# Patient Record
Sex: FEMALE | Race: WHITE | NOT HISPANIC OR LATINO | Employment: OTHER | URBAN - METROPOLITAN AREA
[De-identification: names, ages, dates, MRNs, and addresses within clinical notes are randomized per-mention and may not be internally consistent; named-entity substitution may affect disease eponyms.]

---

## 2022-08-24 ENCOUNTER — OFFICE VISIT (OUTPATIENT)
Dept: SURGICAL ONCOLOGY | Facility: CLINIC | Age: 77
End: 2022-08-24
Payer: MEDICARE

## 2022-08-24 VITALS
BODY MASS INDEX: 38.05 KG/M2 | DIASTOLIC BLOOD PRESSURE: 79 MMHG | SYSTOLIC BLOOD PRESSURE: 161 MMHG | HEART RATE: 81 BPM | HEIGHT: 65 IN | WEIGHT: 228.38 LBS

## 2022-08-24 DIAGNOSIS — K86.2 PANCREATIC CYST: Primary | ICD-10-CM

## 2022-08-24 DIAGNOSIS — Z85.048 PERSONAL HISTORY OF RECTAL CANCER: ICD-10-CM

## 2022-08-24 PROCEDURE — 99213 OFFICE O/P EST LOW 20 MIN: CPT | Mod: PBBFAC | Performed by: SURGERY

## 2022-08-24 PROCEDURE — 99204 PR OFFICE/OUTPT VISIT, NEW, LEVL IV, 45-59 MIN: ICD-10-PCS | Mod: S$PBB,,, | Performed by: SURGERY

## 2022-08-24 PROCEDURE — 99999 PR PBB SHADOW E&M-EST. PATIENT-LVL III: ICD-10-PCS | Mod: PBBFAC,,, | Performed by: SURGERY

## 2022-08-24 PROCEDURE — 99999 PR PBB SHADOW E&M-EST. PATIENT-LVL III: CPT | Mod: PBBFAC,,, | Performed by: SURGERY

## 2022-08-24 PROCEDURE — 99204 OFFICE O/P NEW MOD 45 MIN: CPT | Mod: S$PBB,,, | Performed by: SURGERY

## 2022-08-24 RX ORDER — GLIMEPIRIDE 4 MG/1
4 TABLET ORAL EVERY MORNING
COMMUNITY
Start: 2022-08-02

## 2022-08-24 RX ORDER — SITAGLIPTIN 100 MG/1
100 TABLET, FILM COATED ORAL DAILY
COMMUNITY
Start: 2022-06-01

## 2022-08-24 RX ORDER — SERTRALINE HYDROCHLORIDE 50 MG/1
50 TABLET, FILM COATED ORAL DAILY
COMMUNITY
Start: 2022-08-17

## 2022-08-24 RX ORDER — GABAPENTIN 300 MG/1
300 CAPSULE ORAL 2 TIMES DAILY PRN
COMMUNITY
Start: 2022-08-09 | End: 2022-11-25 | Stop reason: CLARIF

## 2022-08-24 RX ORDER — MONTELUKAST SODIUM 10 MG/1
10 TABLET ORAL DAILY
COMMUNITY
Start: 2022-07-10

## 2022-08-24 RX ORDER — LOSARTAN POTASSIUM 25 MG/1
25 TABLET ORAL DAILY
COMMUNITY
Start: 2022-06-16

## 2022-08-24 RX ORDER — OMEPRAZOLE 20 MG/1
20 CAPSULE, DELAYED RELEASE ORAL DAILY
COMMUNITY
Start: 2022-06-01

## 2022-08-24 RX ORDER — HYDROCHLOROTHIAZIDE 25 MG/1
25 TABLET ORAL DAILY
COMMUNITY
Start: 2022-08-03

## 2022-08-24 NOTE — PROGRESS NOTES
Chief complaint:  Parastomal hernia, pancreatic cyst     HPI:  76-year-old female known to us for previous laparoscopic/robotic abdominoperineal resection in  May of 2020 after neoadjuvant chemoradiation.  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  She went on to receive adjuvant chemotherapy.  She presents today complaining of a parastomal hernia.  She notes bulging in this area with no significant pain.  She reports no significant difficulties with her ostomy appliance or leakage.  Of note during her surveillance imaging they also identified a 2.5 cm enhancing pancreatic mass in the tail of the gland, percutaneous biopsy was performed and revealed normal pancreatic parenchyma.  Follow-up MRI this month revealed stability in size and characteristics.  She reports difficulties with hyperglycemia, no other functional symptoms.        Past Medical and Surgical History  Allergies :   Ephedrine and Penicillin    @[facility]@  Medical :   She has a past medical history of Asthma, Diabetes mellitus, type 2, Hypertension, IBS (irritable bowel syndrome), and Osteoarthritis.    Surgical :   She has a past surgical history that includes Low anterior resection of colon and Wart Removed from Wrist.     Family History  Her family history is not on file.    Social History  She      Review of Systems   Constitutional: Negative for appetite change, chills, diaphoresis and fever.   HENT: Negative for congestion, drooling, ear discharge, ear pain and hearing loss.    Eyes: Negative for discharge.   Respiratory: Negative for apnea, cough, choking, chest tightness, shortness of breath and stridor.    Cardiovascular: Negative for chest pain, palpitations and leg swelling.   Endocrine: Negative for cold intolerance and heat intolerance.   Genitourinary: Negative for difficulty urinating, dyspareunia, dysuria and hematuria.   Musculoskeletal: Negative for arthralgias, gait  problem and joint swelling.   Skin: Negative for color change and rash.   Neurological: Negative for dizziness, tremors, seizures, syncope, facial asymmetry, speech difficulty, light-headedness, numbness and headaches.   Psychiatric/Behavioral: Negative for agitation and confusion.        Objective   Physical Exam  Vitals and nursing note reviewed.   Constitutional:       General: She is not in acute distress.     Appearance: Normal appearance. She is not ill-appearing, toxic-appearing or diaphoretic.   HENT:      Head: Normocephalic and atraumatic.      Right Ear: External ear normal.      Left Ear: External ear normal.      Nose: Nose normal.      Mouth/Throat:      Mouth: Mucous membranes are moist.      Pharynx: Oropharynx is clear.   Eyes:      General: No scleral icterus.     Extraocular Movements: Extraocular movements intact.      Conjunctiva/sclera: Conjunctivae normal.      Pupils: Pupils are equal, round, and reactive to light.   Cardiovascular:      Rate and Rhythm: Normal rate and regular rhythm.      Pulses: Normal pulses.      Heart sounds: No murmur heard.    No friction rub. No gallop.   Pulmonary:      Effort: Pulmonary effort is normal. No respiratory distress.      Breath sounds: Normal breath sounds. No stridor.   Abdominal:      General: Abdomen is flat. There is no distension.      Palpations: Abdomen is soft. There is no mass.      Tenderness: There is no abdominal tenderness. There is no right CVA tenderness, left CVA tenderness, guarding or rebound.      Hernia: No hernia is present.   Musculoskeletal:         General: No swelling, tenderness, deformity or signs of injury.      Cervical back: Normal range of motion and neck supple. No rigidity or tenderness.      Right lower leg: No edema.      Left lower leg: No edema.   Lymphadenopathy:      Cervical: No cervical adenopathy.   Skin:     General: Skin is warm.      Capillary Refill: Capillary refill takes less than 2 seconds.       "Coloration: Skin is not jaundiced or pale.      Findings: No bruising, erythema, lesion or rash.   Neurological:      General: No focal deficit present.      Mental Status: She is alert and oriented to person, place, and time. Mental status is at baseline.      Cranial Nerves: No cranial nerve deficit.      Sensory: No sensory deficit.      Motor: No weakness.      Coordination: Coordination normal.      Gait: Gait normal.   Psychiatric:         Mood and Affect: Mood normal.         Behavior: Behavior normal.         Thought Content: Thought content normal.         Judgment: Judgment normal.       VITAL SIGNS: 24 HR MIN & MAX LAST    @FLOWSTAT(6:24::1)@           @FLOWSTAT(5:24::1)@  (!) 161/79     @FLOWSTAT(8:24::1)@  81     @FLOWSTAT(9:24::1)@       @FLOWSTAT(10:24::1)@         HT: 5' 5" (165.1 cm)  WT: 103.6 kg (228 lb 6.4 oz)  BMI: 38       Assessment & Plan     Parastomal hernia- no current issues with appliance seating or leakage.  Recommend only observation at this time.  Repair would only be entertained if she begins to have issues with her ostomy.    Pancreatic cyst/enhancing mass-- stability in size over time is reassuring, however have some concerns about sampling error with percutaneous biopsy of such a small lesion.  We will refer for EUS/FNA for further characterization. RTC after EUS.     "

## 2022-09-07 ENCOUNTER — DOCUMENTATION ONLY (OUTPATIENT)
Dept: SURGICAL ONCOLOGY | Facility: CLINIC | Age: 77
End: 2022-09-07
Payer: MEDICARE

## 2022-09-07 DIAGNOSIS — K86.2 PANCREATIC CYST: Primary | ICD-10-CM

## 2022-09-07 NOTE — PROGRESS NOTES
Referral faxed to Dr. Mateo Larry for EUS. They are to contact patient with appointment date and time.    P# [phone]  F# [phone]

## 2022-10-19 ENCOUNTER — ANESTHESIA EVENT (OUTPATIENT)
Dept: SURGERY | Facility: HOSPITAL | Age: 77
End: 2022-10-19
Payer: MEDICARE

## 2022-10-19 RX ORDER — OMEPRAZOLE 20 MG/1
20 CAPSULE, DELAYED RELEASE ORAL DAILY
COMMUNITY

## 2022-10-19 RX ORDER — LOSARTAN POTASSIUM 25 MG/1
25 TABLET ORAL DAILY
COMMUNITY

## 2022-10-19 RX ORDER — MULTIVIT WITH MINERALS/HERBS
1 TABLET ORAL DAILY
COMMUNITY

## 2022-10-19 RX ORDER — SERTRALINE HYDROCHLORIDE 50 MG/1
50 TABLET, FILM COATED ORAL DAILY
COMMUNITY

## 2022-10-19 RX ORDER — MONTELUKAST SODIUM 10 MG/1
10 TABLET ORAL NIGHTLY
COMMUNITY

## 2022-10-19 RX ORDER — ASCORBIC ACID 500 MG
500 TABLET ORAL DAILY
COMMUNITY

## 2022-10-19 RX ORDER — GLIMEPIRIDE 4 MG/1
4 TABLET ORAL
COMMUNITY

## 2022-10-19 RX ORDER — HYDROCHLOROTHIAZIDE 25 MG/1
25 TABLET ORAL DAILY
COMMUNITY

## 2022-10-19 RX ORDER — GABAPENTIN 300 MG/1
300 CAPSULE ORAL 2 TIMES DAILY
COMMUNITY

## 2022-10-20 ENCOUNTER — ANESTHESIA (OUTPATIENT)
Dept: SURGERY | Facility: HOSPITAL | Age: 77
End: 2022-10-20
Payer: MEDICARE

## 2022-10-20 ENCOUNTER — HOSPITAL ENCOUNTER (OUTPATIENT)
Facility: HOSPITAL | Age: 77
Discharge: HOME | End: 2022-10-20
Attending: INTERNAL MEDICINE | Admitting: INTERNAL MEDICINE
Payer: MEDICARE

## 2022-10-20 DIAGNOSIS — R93.3 GASTROINTESTINAL TRACT IMAGING ABNORMALITY: ICD-10-CM

## 2022-10-20 DIAGNOSIS — R19.09 OTHER INTRA-ABDOMINAL AND PELVIC SWELLING, MASS AND LUMP: ICD-10-CM

## 2022-10-20 LAB
CANCER AG19-9 SERPL-ACNC: 3.67 UNIT/ML (ref 0–37)
POCT GLUCOSE: 228 MG/DL (ref 70–110)

## 2022-10-20 PROCEDURE — 27201423 OPTIME MED/SURG SUP & DEVICES STERILE SUPPLY: Performed by: INTERNAL MEDICINE

## 2022-10-20 PROCEDURE — 37000009 HC ANESTHESIA EA ADD 15 MINS: Performed by: INTERNAL MEDICINE

## 2022-10-20 PROCEDURE — 86301 IMMUNOASSAY TUMOR CA 19-9: CPT | Performed by: INTERNAL MEDICINE

## 2022-10-20 PROCEDURE — 63600175 PHARM REV CODE 636 W HCPCS

## 2022-10-20 PROCEDURE — 43238 EGD US FINE NEEDLE BX/ASPIR: CPT | Performed by: INTERNAL MEDICINE

## 2022-10-20 PROCEDURE — C1753 CATH, INTRAVAS ULTRASOUND: HCPCS | Performed by: INTERNAL MEDICINE

## 2022-10-20 PROCEDURE — 36415 COLL VENOUS BLD VENIPUNCTURE: CPT | Performed by: INTERNAL MEDICINE

## 2022-10-20 PROCEDURE — 25000003 PHARM REV CODE 250

## 2022-10-20 PROCEDURE — 88305 TISSUE EXAM BY PATHOLOGIST: CPT | Performed by: INTERNAL MEDICINE

## 2022-10-20 PROCEDURE — 37000008 HC ANESTHESIA 1ST 15 MINUTES: Performed by: INTERNAL MEDICINE

## 2022-10-20 RX ORDER — SODIUM CHLORIDE, SODIUM GLUCONATE, SODIUM ACETATE, POTASSIUM CHLORIDE AND MAGNESIUM CHLORIDE 30; 37; 368; 526; 502 MG/100ML; MG/100ML; MG/100ML; MG/100ML; MG/100ML
1000 INJECTION, SOLUTION INTRAVENOUS CONTINUOUS
Status: CANCELLED | OUTPATIENT
Start: 2022-10-20 | End: 2022-11-19

## 2022-10-20 RX ORDER — PROPOFOL 10 MG/ML
VIAL (ML) INTRAVENOUS
Status: DISCONTINUED | OUTPATIENT
Start: 2022-10-20 | End: 2022-10-20

## 2022-10-20 RX ORDER — DIPHENHYDRAMINE HYDROCHLORIDE 50 MG/ML
25 INJECTION INTRAMUSCULAR; INTRAVENOUS EVERY 6 HOURS PRN
Status: CANCELLED | OUTPATIENT
Start: 2022-10-20

## 2022-10-20 RX ORDER — MIDAZOLAM HYDROCHLORIDE 1 MG/ML
INJECTION INTRAMUSCULAR; INTRAVENOUS
Status: DISCONTINUED | OUTPATIENT
Start: 2022-10-20 | End: 2022-10-20

## 2022-10-20 RX ORDER — ONDANSETRON 2 MG/ML
4 INJECTION INTRAMUSCULAR; INTRAVENOUS DAILY PRN
Status: CANCELLED | OUTPATIENT
Start: 2022-10-20

## 2022-10-20 RX ORDER — HYDROMORPHONE HYDROCHLORIDE 2 MG/ML
0.2 INJECTION, SOLUTION INTRAMUSCULAR; INTRAVENOUS; SUBCUTANEOUS EVERY 5 MIN PRN
Status: CANCELLED | OUTPATIENT
Start: 2022-10-20

## 2022-10-20 RX ORDER — ACETAMINOPHEN 10 MG/ML
1000 INJECTION, SOLUTION INTRAVENOUS ONCE
Status: CANCELLED | OUTPATIENT
Start: 2022-10-20 | End: 2022-10-20

## 2022-10-20 RX ORDER — PROPOFOL 10 MG/ML
VIAL (ML) INTRAVENOUS CONTINUOUS PRN
Status: DISCONTINUED | OUTPATIENT
Start: 2022-10-20 | End: 2022-10-20

## 2022-10-20 RX ORDER — MIDAZOLAM HYDROCHLORIDE 1 MG/ML
2 INJECTION INTRAMUSCULAR; INTRAVENOUS ONCE AS NEEDED
Status: CANCELLED | OUTPATIENT
Start: 2022-10-20 | End: 2034-03-18

## 2022-10-20 RX ORDER — LIDOCAINE HYDROCHLORIDE 10 MG/ML
1 INJECTION, SOLUTION EPIDURAL; INFILTRATION; INTRACAUDAL; PERINEURAL ONCE
Status: CANCELLED | OUTPATIENT
Start: 2022-10-20 | End: 2022-10-20

## 2022-10-20 RX ADMIN — PROPOFOL 100 MCG/KG/MIN: 10 INJECTION, EMULSION INTRAVENOUS at 09:10

## 2022-10-20 RX ADMIN — PROPOFOL 50 MCG/KG/MIN: 10 INJECTION, EMULSION INTRAVENOUS at 10:10

## 2022-10-20 RX ADMIN — PROPOFOL 30 MG: 10 INJECTION, EMULSION INTRAVENOUS at 10:10

## 2022-10-20 RX ADMIN — SODIUM CHLORIDE, SODIUM GLUCONATE, SODIUM ACETATE, POTASSIUM CHLORIDE AND MAGNESIUM CHLORIDE: 526; 502; 368; 37; 30 INJECTION, SOLUTION INTRAVENOUS at 10:10

## 2022-10-20 RX ADMIN — MIDAZOLAM HYDROCHLORIDE 2 MG: 1 INJECTION, SOLUTION INTRAMUSCULAR; INTRAVENOUS at 10:10

## 2022-10-20 RX ADMIN — PROPOFOL 20 MG: 10 INJECTION, EMULSION INTRAVENOUS at 09:10

## 2022-10-20 NOTE — TRANSFER OF CARE
"Anesthesia Transfer of Care Note    Patient: Sierra Alva    Procedure(s) Performed: Procedure(s) (LRB):  Upper EUS w/poss FNA. (N/A)    Patient location: OPS    Anesthesia Type: general    Transport from OR: Transported from OR on 6-10 L/min O2 by face mask with adequate spontaneous ventilation    Post pain: adequate analgesia    Post assessment: no apparent anesthetic complications and tolerated procedure well    Post vital signs: stable    Level of consciousness: awake, alert and oriented    Nausea/Vomiting: no nausea/vomiting    Complications: none    Transfer of care protocol was followed      Last vitals:   Visit Vitals  BP (!) 142/88   Pulse 73   Temp 36.4 °C (97.6 °F) (Oral)   Ht 5' 5" (1.651 m)   Wt 103 kg (227 lb)   LMP  (LMP Unknown)   SpO2 96%   Breastfeeding No   BMI 37.77 kg/m²     "

## 2022-10-20 NOTE — PROVATION PATIENT INSTRUCTIONS
Discharge Summary/Instructions after an Endoscopic Procedure  Patient Name: Sierra Alva  Patient MRN: [id number]  Patient YOB: 1945 Thursday, October 20, 2022  Mateo Larry MD  Dear patient,  As a result of recent federal legislation (The Federal Cures Act), you may   receive lab or pathology results from your procedure in your [facility]   account before your physician is able to contact you. Your physician or   their representative will relay the results to you with their   recommendations at their soonest availability.  Thank you,  RESTRICTIONS:  During your procedure today, you received medications for sedation.  These   medications may affect your judgment, balance and coordination.  Therefore,   for 24 hours, you have the following restrictions:   - DO NOT drive a car, operate machinery, make legal/financial decisions,   sign important papers or drink alcohol.    ACTIVITY:  Today: no heavy lifting, straining or running due to procedural   sedation/anesthesia.  The following day: return to full activity including work.  DIET:  Eat and drink normally unless instructed otherwise.     TREATMENT FOR COMMON SIDE EFFECTS:  - Mild abdominal pain, nausea, belching, bloating or excessive gas:  rest,   eat lightly and use a heating pad.  - Sore Throat: treat with throat lozenges and/or gargle with warm salt   water.  - Because air was used during the procedure, expelling large amounts of air   from your rectum or belching is normal.  - If a bowel prep was taken, you may not have a bowel movement for 1-3 days.    This is normal.  SYMPTOMS TO WATCH FOR AND REPORT TO YOUR PHYSICIAN:  1. Abdominal pain or bloating, other than gas cramps.  2. Chest pain.  3. Back pain.  4. Signs of infection such as: chills or fever occurring within 24 hours   after the procedure.  5. Rectal bleeding, which would show as bright red, maroon, or black stools.   (A tablespoon of blood from the rectum is not serious, especially  if   hemorrhoids are present.)  6. Vomiting.  7. Weakness or dizziness.  GO DIRECTLY TO THE NEAREST EMERGENCY ROOM IF YOU HAVE ANY OF THE FOLLOWING:      Difficulty breathing              Chills and/or fever over 101 F   Persistent vomiting and/or vomiting blood   Severe abdominal pain   Severe chest pain   Black, tarry stools   Bleeding- more than one tablespoon   Any other symptom or condition that you feel may need urgent attention  Your doctor recommends these additional instructions:  If any biopsies were taken, your doctors clinic will contact you in 1 to 2   weeks with any results.  - Discharge patient to home (with spouse).   - Clear liquid diet today, then advance as tolerated to resume previous   diet.   - Continue present medications.   - Observe patient's clinical course.   - Await cytology results.   - Return to referring physician as previously scheduled.   - The findings and recommendations were discussed with the patient and their   spouse.  For questions, problems or results please call your physician - Mateo Larry MD at Work:  [phone].  [facility] [place], EMERGENCY ROOM PHONE NUMBER: [phone]  IF A COMPLICATION OR EMERGENCY SITUATION ARISES AND YOU ARE UNABLE TO REACH   YOUR PHYSICIAN - GO DIRECTLY TO THE EMERGENCY ROOM.  Mateo Larry MD  10/20/2022 11:14:57 AM  This report has been verified and signed electronically.  Dear patient,  As a result of recent federal legislation (The Federal Cures Act), you may   receive lab or pathology results from your procedure in your [facility]   account before your physician is able to contact you. Your physician or   their representative will relay the results to you with their   recommendations at their soonest availability.  Thank you,  PROVATION

## 2022-10-20 NOTE — DISCHARGE INSTRUCTIONS
-NO driving and NO alcohol consumption for 24 hours and while taking narcotic pain medication.      -Although no incisions were made monitor for signs of infection such as fever, chills.      -Sore throat and/or mild cough is expected.  Hard candies, peppermints, throat lozenges, gargling with warm water and salt may help     -Report to your nearest ER and/notify your doctor if you experience any SUDDEN/SEVERE chest/abdominal pain, weakness, trouble breathing, uncontrolled pain, vomiting/coughing up blood.

## 2022-10-20 NOTE — ANESTHESIA PREPROCEDURE EVALUATION
10/20/2022  Sierra Alva is a 76 y.o. female with GI tract imaging abnormality for EGD and U/S guided biopsy.  She is feeling well this AM, denies cardiopulmonary complaints.      Pre-op Assessment    I have reviewed the Patient Summary Reports.     I have reviewed the Nursing Notes. I have reviewed the NPO Status.   I have reviewed the Medications.     Review of Systems  Anesthesia Hx:  No problems with previous Anesthesia  Denies Family Hx of Anesthesia complications.   Denies Personal Hx of Anesthesia complications.   Cardiovascular:   Exercise tolerance: good Hypertension    Pulmonary:   Asthma    Hepatic/GI:   GERD    Musculoskeletal:   Arthritis     Endocrine:   Diabetes  Obesity / BMI > 30      Physical Exam  General: Well nourished, Cooperative, Alert and Oriented    Airway:  Mallampati: II   Mouth Opening: Normal  TM Distance: Normal  Tongue: Normal  Neck ROM: Normal ROM    Dental:  Intact    Chest/Lungs:  Clear to auscultation, Normal Respiratory Rate    Heart:  Rate: Normal  Rhythm: Regular Rhythm        Anesthesia Plan  Type of Anesthesia, risks & benefits discussed:    Anesthesia Type: Gen Natural Airway  Intra-op Monitoring Plan: Standard ASA Monitors  Post Op Pain Control Plan: multimodal analgesia  Induction:  IV  Informed Consent: Informed consent signed with the Patient and all parties understand the risks and agree with anesthesia plan.  All questions answered.   ASA Score: 2  Day of Surgery Review of History & Physical: H&P Update referred to the surgeon/provider.    Ready For Surgery From Anesthesia Perspective.     .

## 2022-10-20 NOTE — H&P
Endoscopy History and Physical    PCP - Drew Esquivel MD    Procedure - EUS  ASA & Mallampati - per anesthesia      HPI:  This is a 76 y.o. female here for evaluation of a TOP lesion.       ROS:  Constitutional: No fevers, chills, No weight loss  ENT: No allergies  CV: No chest pain  Pulm: No shortness of breath  GI: see HPI  Derm: No rash    Medical History:  has a past medical history of Abdominal hernia, Abdominal pain, Abnormal findings on radiological examination of gastrointestinal tract, Anxiety, Arthritis, Asthma, Colon cancer, Colostomy in place, Diabetes mellitus, Diabetes mellitus, type 2, GERD (gastroesophageal reflux disease), Hypertension, Hypotension, iatrogenic (1965), IBS (irritable bowel syndrome), Neuropathy, Obesity, Osteoarthritis, Other intra-abdominal and pelvic swelling, mass and lump, Shingles (2019), and Walker as ambulation aid.    Surgical History:  has a past surgical history that includes Low anterior resection of colon; Wart Removed from Wrist; Colectomy; Colonoscopy; Removal Wart Hand; and Mediport insertion, single.    Family History: family history is not on file.     Social History:  reports that she has quit smoking. Her smoking use included cigarettes. She has never used smokeless tobacco. She reports current alcohol use. She reports that she does not use drugs.    Review of patient's allergies indicates:   Allergen Reactions    Amoxicillin Rash    Ephedrine Palpitations     Other reaction(s): Throat tightness    Ephedrine Palpitations    Penicillin Rash       Medications:   Medications Prior to Admission   Medication Sig Dispense Refill Last Dose    ascorbic acid, vitamin C, (VITAMIN C) 500 MG tablet Take 500 mg by mouth once daily.   10/19/2022    b complex vitamins tablet Take 1 tablet by mouth once daily.   10/19/2022    BIOTIN ORAL Take 1 tablet by mouth Daily.   10/19/2022    calcium carbonate/vitamin D3 (VITAMIN D-3 ORAL) Take 1 tablet by mouth Daily.   10/19/2022     gabapentin (NEURONTIN) 300 MG capsule Take 300 mg by mouth 2 (two) times daily as needed.   10/19/2022    gabapentin (NEURONTIN) 300 MG capsule Take 300 mg by mouth 2 (two) times daily.       glimepiride (AMARYL) 4 MG tablet Take 4 mg by mouth every morning.   10/19/2022    glimepiride (AMARYL) 4 MG tablet Take 4 mg by mouth before breakfast.       glucosamine HCl/chondroitin gresham (GLUCOSAMINE-CHONDROITIN ORAL) Take 1 tablet by mouth Daily.   10/19/2022    grape seed extract (GRAPE SEED ORAL) Take 1 capsule by mouth Daily.   10/19/2022    hydroCHLOROthiazide (HYDRODIURIL) 25 MG tablet Take 25 mg by mouth once daily.   10/20/2022    JANUVIA 100 mg Tab Take 100 mg by mouth once daily.   10/19/2022    losartan (COZAAR) 25 MG tablet Take 25 mg by mouth once daily.   10/20/2022    losartan (COZAAR) 25 MG tablet Take 25 mg by mouth once daily.       MAGNESIUM ORAL Take 1 tablet by mouth Daily.   10/19/2022    montelukast (SINGULAIR) 10 mg tablet Take 10 mg by mouth once daily.   10/19/2022    montelukast (SINGULAIR) 10 mg tablet Take 10 mg by mouth every evening.       NON FORMULARY MEDICATION Take 1 tablet by mouth once daily. [facility]   10/19/2022    NON FORMULARY MEDICATION Take 1 tablet by mouth every other day. Liver Detox   10/19/2022    omeprazole (PRILOSEC) 20 MG capsule Take 20 mg by mouth once daily.   10/19/2022    omeprazole (PRILOSEC) 20 MG capsule Take 20 mg by mouth once daily.       POTASSIUM ORAL Take 1 tablet by mouth Daily.   10/19/2022    sertraline (ZOLOFT) 50 MG tablet Take 50 mg by mouth once daily.   10/19/2022    sertraline (ZOLOFT) 50 MG tablet Take 50 mg by mouth once daily.       SITagliptin (JANUVIA) 100 MG Tab Take 100 mg by mouth once daily.       hydroCHLOROthiazide (HYDRODIURIL) 25 MG tablet Take 25 mg by mouth once daily.            Objective Findings:    Vital Signs: see nursing notes  Physical Exam:  General Appearance: Obese WF, well appearing in no acute distress  Eyes:    No scleral  icterus  ENT: Neck supple  Lungs: CTA anteriorly  Heart:  S1, S2 normal, no murmurs heard  Abdomen: Soft, non tender, non distended with positive bowel sounds. No hepatosplenomegaly, ascites, or mass  Extremities: no edema  Skin: No rash      Labs:  Lab Results   Component Value Date    WBC 3.7 (L) 06/02/2020    HGB 12.1 06/02/2020    HCT 37.2 06/02/2020     (L) 06/02/2020    ALT 19 06/02/2020    AST 18 06/02/2020     06/02/2020    K 3.9 06/02/2020    CREATININE 0.75 06/02/2020    BUN 12.3 06/02/2020    CO2 29 06/02/2020    INR 1.0 06/02/2020       I have explained the risks and benefits of endoscopy procedures to the patient including but not limited to bleeding, perforation, infection, and death.    Mateo Larry MD

## 2022-10-21 VITALS
DIASTOLIC BLOOD PRESSURE: 98 MMHG | TEMPERATURE: 98 F | SYSTOLIC BLOOD PRESSURE: 168 MMHG | BODY MASS INDEX: 37.82 KG/M2 | OXYGEN SATURATION: 94 % | WEIGHT: 227 LBS | HEART RATE: 75 BPM | HEIGHT: 65 IN

## 2022-10-24 LAB
ESTROGEN SERPL-MCNC: NORMAL PG/ML
INSULIN SERPL-ACNC: NORMAL U[IU]/ML
LAB AP CLINICAL INFORMATION: NORMAL
LAB AP GROSS DESCRIPTION: NORMAL
LAB AP INTRA OP: NORMAL
LAB AP REPORT FOOTNOTES: NORMAL
T3RU NFR SERPL: NORMAL %

## 2022-10-25 NOTE — ANESTHESIA POSTPROCEDURE EVALUATION
Anesthesia Post Evaluation    Patient: Sierra Alva    Procedure(s) Performed: Procedure(s) (LRB):  Upper EUS w/poss FNA. (N/A)    Final Anesthesia Type: general      Patient location during evaluation: PACU  Patient participation: Yes- Able to Participate  Level of consciousness: awake and alert  Post-procedure vital signs: reviewed and stable  Pain management: adequate  Airway patency: patent      Anesthetic complications: no      Cardiovascular status: blood pressure returned to baseline  Respiratory status: unassisted  Hydration status: euvolemic  Follow-up not needed.          Vitals Value Taken Time   /98 10/20/22 1258   Temp ** 10/25/22 1334   Pulse 75 10/20/22 1258   Resp ** 10/25/22 1334   SpO2 94 % 10/20/22 1258         No case tracking events are documented in the log.      Pain/Abhishek Score: No data recorded

## 2022-10-26 ENCOUNTER — OFFICE VISIT (OUTPATIENT)
Dept: SURGICAL ONCOLOGY | Facility: CLINIC | Age: 77
End: 2022-10-26
Payer: MEDICARE

## 2022-10-26 VITALS
SYSTOLIC BLOOD PRESSURE: 157 MMHG | BODY MASS INDEX: 37.65 KG/M2 | WEIGHT: 226 LBS | DIASTOLIC BLOOD PRESSURE: 96 MMHG | HEART RATE: 93 BPM | HEIGHT: 65 IN

## 2022-10-26 DIAGNOSIS — C7A.8 NEUROENDOCRINE CARCINOMA OF PANCREAS: Primary | ICD-10-CM

## 2022-10-26 PROCEDURE — 99215 OFFICE O/P EST HI 40 MIN: CPT | Mod: S$PBB,,, | Performed by: SURGERY

## 2022-10-26 PROCEDURE — 99999 PR PBB SHADOW E&M-EST. PATIENT-LVL IV: ICD-10-PCS | Mod: PBBFAC,,, | Performed by: SURGERY

## 2022-10-26 PROCEDURE — 99999 PR PBB SHADOW E&M-EST. PATIENT-LVL IV: CPT | Mod: PBBFAC,,, | Performed by: SURGERY

## 2022-10-26 PROCEDURE — 99215 PR OFFICE/OUTPT VISIT, EST, LEVL V, 40-54 MIN: ICD-10-PCS | Mod: S$PBB,,, | Performed by: SURGERY

## 2022-10-26 PROCEDURE — 99214 OFFICE O/P EST MOD 30 MIN: CPT | Mod: PBBFAC | Performed by: SURGERY

## 2022-10-26 NOTE — PROGRESS NOTES
Chief complaint:  Parastomal hernia, pancreatic cyst     HPI:  76-year-old female known to us for previous laparoscopic/robotic abdominoperineal resection in  May of 2020 after neoadjuvant chemoradiation.  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  She went on to receive adjuvant chemotherapy.  She presents today complaining of a parastomal hernia.  She notes bulging in this area with no significant pain.  She reports no significant difficulties with her ostomy appliance or leakage.  Of note during her surveillance imaging they also identified a 2.5 cm enhancing pancreatic mass in the tail of the gland, percutaneous biopsy was performed and revealed normal pancreatic parenchyma.  Follow-up MRI this month revealed stability in size and characteristics.  She reports difficulties with hyperglycemia, no other functional symptoms.    The patient returns after going endoscopic ultrasound which revealed an ill-defined mass in the pancreatic tail, FNA was performed and revealed well-differentiated neuroendocrine carcinoma.    Greater than 45 minutes was required for complete chart review, imaging review including interpretation of imaging, coordination with referring/other physicians, patient counseling regarding diagnosis/treatment plan, answering questions, medical decision making, and documentation.          Past Medical and Surgical History  Allergies :   Amoxicillin, Ephedrine, Ephedrine, and Penicillin    [email or web address]  Medical :   She has a past medical history of Abdominal hernia, Abdominal pain, Abnormal findings on radiological examination of gastrointestinal tract, Anxiety, Arthritis, Asthma, Colon cancer, Colostomy in place, Diabetes mellitus, Diabetes mellitus, type 2, GERD (gastroesophageal reflux disease), Hypertension, Hypotension, iatrogenic (1965), IBS (irritable bowel syndrome), Neuropathy, Obesity, Osteoarthritis, Other intra-abdominal and  pelvic swelling, mass and lump, Shingles (2019), and Walker as ambulation aid.    Surgical :   She has a past surgical history that includes Low anterior resection of colon; Wart Removed from Wrist; Colectomy; Colonoscopy; Removal Wart Hand; and Mediport insertion, single.     Family History  Her family history is not on file.    Social History  She reports that she has quit smoking. Her smoking use included cigarettes. She has never used smokeless tobacco. She reports current alcohol use. She reports that she does not use drugs.     Review of Systems   Constitutional:  Negative for appetite change, chills, diaphoresis and fever.   HENT:  Negative for congestion, drooling, ear discharge, ear pain and hearing loss.    Eyes:  Negative for discharge.   Respiratory:  Negative for apnea, cough, choking, chest tightness, shortness of breath and stridor.    Cardiovascular:  Negative for chest pain, palpitations and leg swelling.   Endocrine: Negative for cold intolerance and heat intolerance.   Genitourinary:  Negative for difficulty urinating, dyspareunia, dysuria and hematuria.   Musculoskeletal:  Negative for arthralgias, gait problem and joint swelling.   Skin:  Negative for color change and rash.   Neurological:  Negative for dizziness, tremors, seizures, syncope, facial asymmetry, speech difficulty, light-headedness, numbness and headaches.   Psychiatric/Behavioral:  Negative for agitation and confusion.       Objective   Physical Exam  Vitals and nursing note reviewed.   Constitutional:       General: She is not in acute distress.     Appearance: Normal appearance. She is not ill-appearing, toxic-appearing or diaphoretic.   HENT:      Head: Normocephalic and atraumatic.      Right Ear: External ear normal.      Left Ear: External ear normal.      Nose: Nose normal.      Mouth/Throat:      Mouth: Mucous membranes are moist.      Pharynx: Oropharynx is clear.   Eyes:      General: No scleral icterus.     Extraocular  "Movements: Extraocular movements intact.      Conjunctiva/sclera: Conjunctivae normal.      Pupils: Pupils are equal, round, and reactive to light.   Cardiovascular:      Rate and Rhythm: Normal rate and regular rhythm.      Pulses: Normal pulses.      Heart sounds: No murmur heard.    No friction rub. No gallop.   Pulmonary:      Effort: Pulmonary effort is normal. No respiratory distress.      Breath sounds: Normal breath sounds. No stridor.   Abdominal:      General: Abdomen is flat. There is no distension.      Palpations: Abdomen is soft. There is no mass.      Tenderness: There is no abdominal tenderness. There is no right CVA tenderness, left CVA tenderness, guarding or rebound.      Hernia: No hernia is present.   Musculoskeletal:         General: No swelling, tenderness, deformity or signs of injury.      Cervical back: Normal range of motion and neck supple. No rigidity or tenderness.      Right lower leg: No edema.      Left lower leg: No edema.   Lymphadenopathy:      Cervical: No cervical adenopathy.   Skin:     General: Skin is warm.      Capillary Refill: Capillary refill takes less than 2 seconds.      Coloration: Skin is not jaundiced or pale.      Findings: No bruising, erythema, lesion or rash.   Neurological:      General: No focal deficit present.      Mental Status: She is alert and oriented to person, place, and time. Mental status is at baseline.      Cranial Nerves: No cranial nerve deficit.      Sensory: No sensory deficit.      Motor: No weakness.      Coordination: Coordination normal.      Gait: Gait normal.   Psychiatric:         Mood and Affect: Mood normal.         Behavior: Behavior normal.         Thought Content: Thought content normal.         Judgment: Judgment normal.     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    Diagnosis, staging, prognosis and treatment guidelines for pancreatic neuroendocrine cancer were discussed with the patient in detail, all questions were addressed.Using visual aids and drawings, I described the anatomy and potential surgical procedures.    Schedule laparoscopic/robotic distal pancreatectomy/splenectomy, intraoperative ultrasound    The risks and benefits of the procedure were explained in detail, questions were addressed, the patient gives consent to proceed      Ernesto Stearns MD  Surgical Oncology  Complex General, Gastrointestinal and Hepatobiliary Surgery

## 2022-11-16 DIAGNOSIS — C7A.8 NEUROENDOCRINE CARCINOMA OF PANCREAS: Primary | ICD-10-CM

## 2022-11-16 DIAGNOSIS — D3A.8 NEUROENDOCRINE TUMOR: ICD-10-CM

## 2022-11-16 RX ORDER — ENOXAPARIN SODIUM 100 MG/ML
30 INJECTION SUBCUTANEOUS EVERY 24 HOURS
Status: CANCELLED | OUTPATIENT
Start: 2022-11-16

## 2022-11-16 RX ORDER — CLINDAMYCIN PHOSPHATE 150 MG/ML
600 INJECTION, SOLUTION INTRAVENOUS ONCE
Status: CANCELLED | OUTPATIENT
Start: 2022-11-28

## 2022-11-22 ENCOUNTER — HOSPITAL ENCOUNTER (OUTPATIENT)
Dept: RADIOLOGY | Facility: HOSPITAL | Age: 77
Discharge: HOME | End: 2022-11-22
Attending: SURGERY
Payer: MEDICARE

## 2022-11-22 ENCOUNTER — OFFICE VISIT (OUTPATIENT)
Dept: SURGICAL ONCOLOGY | Facility: CLINIC | Age: 77
End: 2022-11-22
Payer: MEDICARE

## 2022-11-22 VITALS
DIASTOLIC BLOOD PRESSURE: 90 MMHG | WEIGHT: 227.38 LBS | BODY MASS INDEX: 37.88 KG/M2 | SYSTOLIC BLOOD PRESSURE: 178 MMHG | HEIGHT: 65 IN | HEART RATE: 80 BPM

## 2022-11-22 DIAGNOSIS — C7A.8 NEUROENDOCRINE CARCINOMA OF PANCREAS: ICD-10-CM

## 2022-11-22 DIAGNOSIS — C7A.8 NEUROENDOCRINE CARCINOMA OF PANCREAS: Primary | ICD-10-CM

## 2022-11-22 PROCEDURE — 71045 X-RAY EXAM CHEST 1 VIEW: CPT | Mod: TC

## 2022-11-22 PROCEDURE — 99999 PR PBB SHADOW E&M-EST. PATIENT-LVL IV: ICD-10-PCS | Mod: PBBFAC,,, | Performed by: SURGERY

## 2022-11-22 PROCEDURE — 99214 PR OFFICE/OUTPT VISIT, EST, LEVL IV, 30-39 MIN: ICD-10-PCS | Mod: S$PBB,,, | Performed by: SURGERY

## 2022-11-22 PROCEDURE — 99999 PR PBB SHADOW E&M-EST. PATIENT-LVL IV: CPT | Mod: PBBFAC,,, | Performed by: SURGERY

## 2022-11-22 PROCEDURE — 99214 OFFICE O/P EST MOD 30 MIN: CPT | Mod: S$PBB,,, | Performed by: SURGERY

## 2022-11-22 PROCEDURE — 99214 OFFICE O/P EST MOD 30 MIN: CPT | Mod: PBBFAC | Performed by: SURGERY

## 2022-11-22 NOTE — H&P (VIEW-ONLY)
Chief complaint:  Parastomal hernia, pancreatic cyst     HPI:  76-year-old female known to us for previous laparoscopic/robotic abdominoperineal resection in  May of 2020 after neoadjuvant chemoradiation.  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  She went on to receive adjuvant chemotherapy.  She presents today complaining of a parastomal hernia.  She notes bulging in this area with no significant pain.  She reports no significant difficulties with her ostomy appliance or leakage.  Of note during her surveillance imaging they also identified a 2.5 cm enhancing pancreatic mass in the tail of the gland, percutaneous biopsy was performed and revealed normal pancreatic parenchyma.  Follow-up MRI this month revealed stability in size and characteristics.  She reports difficulties with hyperglycemia, no other functional symptoms.    The patient returns after going endoscopic ultrasound which revealed an ill-defined mass in the pancreatic tail, FNA was performed and revealed well-differentiated neuroendocrine carcinoma.    The patient relates that she is Rastafarian and will not receive blood products    Greater than 45 minutes was required for complete chart review, imaging review including interpretation of imaging, coordination with referring/other physicians, patient counseling regarding diagnosis/treatment plan, answering questions, medical decision making, and documentation.          Past Medical and Surgical History  Allergies :   Amoxicillin, Ephedrine, Ephedrine, and Penicillin    [facility]  Medical :   She has a past medical history of Abdominal hernia, Abdominal pain, Abnormal findings on radiological examination of gastrointestinal tract, Anxiety, Arthritis, Asthma, Colon cancer, Colostomy in place, Diabetes mellitus, Diabetes mellitus, type 2, GERD (gastroesophageal reflux disease), Hypertension, Hypotension, iatrogenic (1965), IBS  (irritable bowel syndrome), Neuropathy, Obesity, Osteoarthritis, Other intra-abdominal and pelvic swelling, mass and lump, Shingles (2019), and Walker as ambulation aid.    Surgical :   She has a past surgical history that includes Low anterior resection of colon; Wart Removed from Wrist; Colectomy; Colonoscopy; Removal Wart Hand; and Mediport insertion, single.     Family History  Her family history is not on file.    Social History  She reports that she has quit smoking. Her smoking use included cigarettes. She has never used smokeless tobacco. She reports current alcohol use. She reports that she does not use drugs.     Review of Systems   Constitutional:  Negative for appetite change, chills, diaphoresis and fever.   HENT:  Negative for congestion, drooling, ear discharge, ear pain and hearing loss.    Eyes:  Negative for discharge.   Respiratory:  Negative for apnea, cough, choking, chest tightness, shortness of breath and stridor.    Cardiovascular:  Negative for chest pain, palpitations and leg swelling.   Endocrine: Negative for cold intolerance and heat intolerance.   Genitourinary:  Negative for difficulty urinating, dyspareunia, dysuria and hematuria.   Musculoskeletal:  Negative for arthralgias, gait problem and joint swelling.   Skin:  Negative for color change and rash.   Neurological:  Negative for dizziness, tremors, seizures, syncope, facial asymmetry, speech difficulty, light-headedness, numbness and headaches.   Psychiatric/Behavioral:  Negative for agitation and confusion.       Objective   Physical Exam  Vitals and nursing note reviewed.   Constitutional:       General: She is not in acute distress.     Appearance: Normal appearance. She is not ill-appearing, toxic-appearing or diaphoretic.   HENT:      Head: Normocephalic and atraumatic.      Right Ear: External ear normal.      Left Ear: External ear normal.      Nose: Nose normal.      Mouth/Throat:      Mouth: Mucous membranes are moist.       Pharynx: Oropharynx is clear.   Eyes:      General: No scleral icterus.     Extraocular Movements: Extraocular movements intact.      Conjunctiva/sclera: Conjunctivae normal.      Pupils: Pupils are equal, round, and reactive to light.   Cardiovascular:      Rate and Rhythm: Normal rate and regular rhythm.      Pulses: Normal pulses.      Heart sounds: No murmur heard.    No friction rub. No gallop.   Pulmonary:      Effort: Pulmonary effort is normal. No respiratory distress.      Breath sounds: Normal breath sounds. No stridor.   Abdominal:      General: Abdomen is flat. There is no distension.      Palpations: Abdomen is soft. There is no mass.      Tenderness: There is no abdominal tenderness. There is no right CVA tenderness, left CVA tenderness, guarding or rebound.      Hernia: No hernia is present.   Musculoskeletal:         General: No swelling, tenderness, deformity or signs of injury.      Cervical back: Normal range of motion and neck supple. No rigidity or tenderness.      Right lower leg: No edema.      Left lower leg: No edema.   Lymphadenopathy:      Cervical: No cervical adenopathy.   Skin:     General: Skin is warm.      Capillary Refill: Capillary refill takes less than 2 seconds.      Coloration: Skin is not jaundiced or pale.      Findings: No bruising, erythema, lesion or rash.   Neurological:      General: No focal deficit present.      Mental Status: She is alert and oriented to person, place, and time. Mental status is at baseline.      Cranial Nerves: No cranial nerve deficit.      Sensory: No sensory deficit.      Motor: No weakness.      Coordination: Coordination normal.      Gait: Gait normal.   Psychiatric:         Mood and Affect: Mood normal.         Behavior: Behavior normal.         Thought Content: Thought content normal.         Judgment: Judgment normal.     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    Diagnosis, staging, prognosis and treatment guidelines for pancreatic neuroendocrine cancer were discussed with the patient in detail, all questions were addressed.Using visual aids and drawings, I described the anatomy and potential surgical procedures.    Schedule laparoscopic/robotic distal pancreatectomy/splenectomy, intraoperative ultrasound    The patient relates that she is Latter day and will not receive blood products    The risks and benefits of the procedure were explained in detail, questions were addressed, the patient gives consent to proceed      Ernesto Stearns MD  Surgical Oncology  Complex General, Gastrointestinal and Hepatobiliary Surgery          "

## 2022-11-22 NOTE — PROGRESS NOTES
Chief complaint:  Parastomal hernia, pancreatic cyst     HPI:  76-year-old female known to us for previous laparoscopic/robotic abdominoperineal resection in  May of 2020 after neoadjuvant chemoradiation.  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  She went on to receive adjuvant chemotherapy.  She presents today complaining of a parastomal hernia.  She notes bulging in this area with no significant pain.  She reports no significant difficulties with her ostomy appliance or leakage.  Of note during her surveillance imaging they also identified a 2.5 cm enhancing pancreatic mass in the tail of the gland, percutaneous biopsy was performed and revealed normal pancreatic parenchyma.  Follow-up MRI this month revealed stability in size and characteristics.  She reports difficulties with hyperglycemia, no other functional symptoms.    The patient returns after going endoscopic ultrasound which revealed an ill-defined mass in the pancreatic tail, FNA was performed and revealed well-differentiated neuroendocrine carcinoma.    The patient relates that she is Faith and will not receive blood products    Greater than 45 minutes was required for complete chart review, imaging review including interpretation of imaging, coordination with referring/other physicians, patient counseling regarding diagnosis/treatment plan, answering questions, medical decision making, and documentation.          Past Medical and Surgical History  Allergies :   Amoxicillin, Ephedrine, Ephedrine, and Penicillin    [facility]  Medical :   She has a past medical history of Abdominal hernia, Abdominal pain, Abnormal findings on radiological examination of gastrointestinal tract, Anxiety, Arthritis, Asthma, Colon cancer, Colostomy in place, Diabetes mellitus, Diabetes mellitus, type 2, GERD (gastroesophageal reflux disease), Hypertension, Hypotension, iatrogenic (1965), IBS  (irritable bowel syndrome), Neuropathy, Obesity, Osteoarthritis, Other intra-abdominal and pelvic swelling, mass and lump, Shingles (2019), and Walker as ambulation aid.    Surgical :   She has a past surgical history that includes Low anterior resection of colon; Wart Removed from Wrist; Colectomy; Colonoscopy; Removal Wart Hand; and Mediport insertion, single.     Family History  Her family history is not on file.    Social History  She reports that she has quit smoking. Her smoking use included cigarettes. She has never used smokeless tobacco. She reports current alcohol use. She reports that she does not use drugs.     Review of Systems   Constitutional:  Negative for appetite change, chills, diaphoresis and fever.   HENT:  Negative for congestion, drooling, ear discharge, ear pain and hearing loss.    Eyes:  Negative for discharge.   Respiratory:  Negative for apnea, cough, choking, chest tightness, shortness of breath and stridor.    Cardiovascular:  Negative for chest pain, palpitations and leg swelling.   Endocrine: Negative for cold intolerance and heat intolerance.   Genitourinary:  Negative for difficulty urinating, dyspareunia, dysuria and hematuria.   Musculoskeletal:  Negative for arthralgias, gait problem and joint swelling.   Skin:  Negative for color change and rash.   Neurological:  Negative for dizziness, tremors, seizures, syncope, facial asymmetry, speech difficulty, light-headedness, numbness and headaches.   Psychiatric/Behavioral:  Negative for agitation and confusion.       Objective   Physical Exam  Vitals and nursing note reviewed.   Constitutional:       General: She is not in acute distress.     Appearance: Normal appearance. She is not ill-appearing, toxic-appearing or diaphoretic.   HENT:      Head: Normocephalic and atraumatic.      Right Ear: External ear normal.      Left Ear: External ear normal.      Nose: Nose normal.      Mouth/Throat:      Mouth: Mucous membranes are moist.       Pharynx: Oropharynx is clear.   Eyes:      General: No scleral icterus.     Extraocular Movements: Extraocular movements intact.      Conjunctiva/sclera: Conjunctivae normal.      Pupils: Pupils are equal, round, and reactive to light.   Cardiovascular:      Rate and Rhythm: Normal rate and regular rhythm.      Pulses: Normal pulses.      Heart sounds: No murmur heard.    No friction rub. No gallop.   Pulmonary:      Effort: Pulmonary effort is normal. No respiratory distress.      Breath sounds: Normal breath sounds. No stridor.   Abdominal:      General: Abdomen is flat. There is no distension.      Palpations: Abdomen is soft. There is no mass.      Tenderness: There is no abdominal tenderness. There is no right CVA tenderness, left CVA tenderness, guarding or rebound.      Hernia: No hernia is present.   Musculoskeletal:         General: No swelling, tenderness, deformity or signs of injury.      Cervical back: Normal range of motion and neck supple. No rigidity or tenderness.      Right lower leg: No edema.      Left lower leg: No edema.   Lymphadenopathy:      Cervical: No cervical adenopathy.   Skin:     General: Skin is warm.      Capillary Refill: Capillary refill takes less than 2 seconds.      Coloration: Skin is not jaundiced or pale.      Findings: No bruising, erythema, lesion or rash.   Neurological:      General: No focal deficit present.      Mental Status: She is alert and oriented to person, place, and time. Mental status is at baseline.      Cranial Nerves: No cranial nerve deficit.      Sensory: No sensory deficit.      Motor: No weakness.      Coordination: Coordination normal.      Gait: Gait normal.   Psychiatric:         Mood and Affect: Mood normal.         Behavior: Behavior normal.         Thought Content: Thought content normal.         Judgment: Judgment normal.     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    Diagnosis, staging, prognosis and treatment guidelines for pancreatic neuroendocrine cancer were discussed with the patient in detail, all questions were addressed.Using visual aids and drawings, I described the anatomy and potential surgical procedures.    Schedule laparoscopic/robotic distal pancreatectomy/splenectomy, intraoperative ultrasound    The patient relates that she is Taoism and will not receive blood products    The risks and benefits of the procedure were explained in detail, questions were addressed, the patient gives consent to proceed      Ernesto Stearns MD  Surgical Oncology  Complex General, Gastrointestinal and Hepatobiliary Surgery          "

## 2022-11-28 ENCOUNTER — HOSPITAL ENCOUNTER (INPATIENT)
Facility: HOSPITAL | Age: 77
LOS: 2 days | Discharge: HOME | DRG: 827 | End: 2022-12-01
Attending: SURGERY | Admitting: SURGERY
Payer: MEDICARE

## 2022-11-28 ENCOUNTER — ANESTHESIA (OUTPATIENT)
Dept: SURGERY | Facility: HOSPITAL | Age: 77
DRG: 827 | End: 2022-11-28
Payer: MEDICARE

## 2022-11-28 ENCOUNTER — ANESTHESIA EVENT (OUTPATIENT)
Dept: SURGERY | Facility: HOSPITAL | Age: 77
DRG: 827 | End: 2022-11-28
Payer: MEDICARE

## 2022-11-28 DIAGNOSIS — C7A.8 NEUROENDOCRINE CARCINOMA OF PANCREAS: ICD-10-CM

## 2022-11-28 DIAGNOSIS — D3A.8 NEUROENDOCRINE TUMOR: ICD-10-CM

## 2022-11-28 LAB
POCT GLUCOSE: 166 MG/DL (ref 70–110)
POCT GLUCOSE: 213 MG/DL (ref 70–110)

## 2022-11-28 PROCEDURE — 37000009 HC ANESTHESIA EA ADD 15 MINS: Performed by: SURGERY

## 2022-11-28 PROCEDURE — 94761 N-INVAS EAR/PLS OXIMETRY MLT: CPT

## 2022-11-28 PROCEDURE — 36000713 HC OR TIME LEV V EA ADD 15 MIN: Performed by: SURGERY

## 2022-11-28 PROCEDURE — 71000033 HC RECOVERY, INTIAL HOUR: Performed by: SURGERY

## 2022-11-28 PROCEDURE — 71000015 HC POSTOP RECOV 1ST HR: Performed by: SURGERY

## 2022-11-28 PROCEDURE — 25000003 PHARM REV CODE 250: Performed by: NURSE ANESTHETIST, CERTIFIED REGISTERED

## 2022-11-28 PROCEDURE — 48999 UNLISTED PROCEDURE PANCREAS: CPT | Mod: ,,, | Performed by: SURGERY

## 2022-11-28 PROCEDURE — 99900031 HC PATIENT EDUCATION (STAT)

## 2022-11-28 PROCEDURE — 49329 PR LAP OMENTAL FLAP: ICD-10-PCS | Mod: ,,, | Performed by: SURGERY

## 2022-11-28 PROCEDURE — C1894 INTRO/SHEATH, NON-LASER: HCPCS | Performed by: SURGERY

## 2022-11-28 PROCEDURE — 25000003 PHARM REV CODE 250: Performed by: NURSE PRACTITIONER

## 2022-11-28 PROCEDURE — 36000712 HC OR TIME LEV V 1ST 15 MIN: Performed by: SURGERY

## 2022-11-28 PROCEDURE — 88309 TISSUE EXAM BY PATHOLOGIST: CPT | Performed by: SURGERY

## 2022-11-28 PROCEDURE — 63600175 PHARM REV CODE 636 W HCPCS: Performed by: NURSE PRACTITIONER

## 2022-11-28 PROCEDURE — 63600175 PHARM REV CODE 636 W HCPCS: Performed by: NURSE ANESTHETIST, CERTIFIED REGISTERED

## 2022-11-28 PROCEDURE — 27201423 OPTIME MED/SURG SUP & DEVICES STERILE SUPPLY: Performed by: SURGERY

## 2022-11-28 PROCEDURE — 25000003 PHARM REV CODE 250: Performed by: SURGERY

## 2022-11-28 PROCEDURE — 71000039 HC RECOVERY, EACH ADD'L HOUR: Performed by: SURGERY

## 2022-11-28 PROCEDURE — 25000003 PHARM REV CODE 250

## 2022-11-28 PROCEDURE — 37000008 HC ANESTHESIA 1ST 15 MINUTES: Performed by: SURGERY

## 2022-11-28 PROCEDURE — 27000190 HC CPAP FULL FACE MASK W/VALVE

## 2022-11-28 PROCEDURE — 38589 PR LAPAROSCOPIC ABDOMINAL LYMPHADENECTOMY: ICD-10-PCS | Mod: ,,, | Performed by: SURGERY

## 2022-11-28 PROCEDURE — 63600175 PHARM REV CODE 636 W HCPCS: Performed by: SURGERY

## 2022-11-28 PROCEDURE — 99900035 HC TECH TIME PER 15 MIN (STAT)

## 2022-11-28 PROCEDURE — 27000221 HC OXYGEN, UP TO 24 HOURS

## 2022-11-28 PROCEDURE — 71000016 HC POSTOP RECOV ADDL HR: Performed by: SURGERY

## 2022-11-28 PROCEDURE — 63600175 PHARM REV CODE 636 W HCPCS: Performed by: ANESTHESIOLOGY

## 2022-11-28 PROCEDURE — A4216 STERILE WATER/SALINE, 10 ML: HCPCS | Performed by: SURGERY

## 2022-11-28 PROCEDURE — 63600175 PHARM REV CODE 636 W HCPCS

## 2022-11-28 PROCEDURE — 48999 PR LAP, DISTAL PANCREATECTOMY W/WOUT SPLENCTOMY: ICD-10-PCS | Mod: ,,, | Performed by: SURGERY

## 2022-11-28 PROCEDURE — C1729 CATH, DRAINAGE: HCPCS | Performed by: SURGERY

## 2022-11-28 PROCEDURE — 38589 UNLISTED LAPS PX LYMPHTC SYS: CPT | Mod: ,,, | Performed by: SURGERY

## 2022-11-28 PROCEDURE — 94660 CPAP INITIATION&MGMT: CPT

## 2022-11-28 PROCEDURE — 49329 UNLSTD LAPS PX ABD PERTM&OMN: CPT | Mod: ,,, | Performed by: SURGERY

## 2022-11-28 RX ORDER — ONDANSETRON 2 MG/ML
4 INJECTION INTRAMUSCULAR; INTRAVENOUS ONCE AS NEEDED
Status: COMPLETED | OUTPATIENT
Start: 2022-11-28 | End: 2022-11-28

## 2022-11-28 RX ORDER — CLINDAMYCIN PHOSPHATE 600 MG/50ML
600 INJECTION, SOLUTION INTRAVENOUS ONCE
Status: DISCONTINUED | OUTPATIENT
Start: 2022-11-28 | End: 2022-12-01 | Stop reason: HOSPADM

## 2022-11-28 RX ORDER — PROPOFOL 10 MG/ML
VIAL (ML) INTRAVENOUS
Status: DISCONTINUED | OUTPATIENT
Start: 2022-11-28 | End: 2022-11-28

## 2022-11-28 RX ORDER — FAMOTIDINE 10 MG/ML
20 INJECTION INTRAVENOUS 2 TIMES DAILY
Status: DISCONTINUED | OUTPATIENT
Start: 2022-11-28 | End: 2022-12-01 | Stop reason: HOSPADM

## 2022-11-28 RX ORDER — LIDOCAINE HYDROCHLORIDE 20 MG/ML
INJECTION, SOLUTION EPIDURAL; INFILTRATION; INTRACAUDAL; PERINEURAL
Status: DISCONTINUED | OUTPATIENT
Start: 2022-11-28 | End: 2022-11-28

## 2022-11-28 RX ORDER — PROMETHAZINE HYDROCHLORIDE 25 MG/ML
INJECTION, SOLUTION INTRAMUSCULAR; INTRAVENOUS
Status: COMPLETED
Start: 2022-11-28 | End: 2022-11-28

## 2022-11-28 RX ORDER — CLINDAMYCIN PHOSPHATE 900 MG/50ML
INJECTION, SOLUTION INTRAVENOUS
Status: DISCONTINUED | OUTPATIENT
Start: 2022-11-28 | End: 2022-11-28

## 2022-11-28 RX ORDER — ENOXAPARIN SODIUM 100 MG/ML
30 INJECTION SUBCUTANEOUS EVERY 24 HOURS
Status: DISCONTINUED | OUTPATIENT
Start: 2022-11-28 | End: 2022-11-29

## 2022-11-28 RX ORDER — CLINDAMYCIN PHOSPHATE 150 MG/ML
600 INJECTION, SOLUTION INTRAVENOUS ONCE
Status: DISCONTINUED | OUTPATIENT
Start: 2022-11-28 | End: 2022-11-28

## 2022-11-28 RX ORDER — MEPERIDINE HYDROCHLORIDE 25 MG/ML
12.5 INJECTION INTRAMUSCULAR; INTRAVENOUS; SUBCUTANEOUS EVERY 10 MIN PRN
Status: ACTIVE | OUTPATIENT
Start: 2022-11-28 | End: 2022-11-29

## 2022-11-28 RX ORDER — HYDROMORPHONE HYDROCHLORIDE 2 MG/ML
INJECTION, SOLUTION INTRAMUSCULAR; INTRAVENOUS; SUBCUTANEOUS
Status: DISCONTINUED | OUTPATIENT
Start: 2022-11-28 | End: 2022-11-28

## 2022-11-28 RX ORDER — METHOCARBAMOL 100 MG/ML
INJECTION, SOLUTION INTRAMUSCULAR; INTRAVENOUS
Status: COMPLETED
Start: 2022-11-28 | End: 2022-11-28

## 2022-11-28 RX ORDER — ACETAMINOPHEN 10 MG/ML
1000 INJECTION, SOLUTION INTRAVENOUS EVERY 8 HOURS
Status: DISPENSED | OUTPATIENT
Start: 2022-11-28 | End: 2022-11-29

## 2022-11-28 RX ORDER — PROMETHAZINE HYDROCHLORIDE 25 MG/1
25 TABLET ORAL EVERY 6 HOURS PRN
Status: DISCONTINUED | OUTPATIENT
Start: 2022-11-28 | End: 2022-12-01 | Stop reason: HOSPADM

## 2022-11-28 RX ORDER — ACETAMINOPHEN 10 MG/ML
1000 INJECTION, SOLUTION INTRAVENOUS
Status: ACTIVE | OUTPATIENT
Start: 2022-11-28 | End: 2022-11-29

## 2022-11-28 RX ORDER — PROMETHAZINE HYDROCHLORIDE 25 MG/ML
INJECTION, SOLUTION INTRAMUSCULAR; INTRAVENOUS
Status: DISPENSED
Start: 2022-11-28 | End: 2022-11-29

## 2022-11-28 RX ORDER — METOPROLOL TARTRATE 1 MG/ML
INJECTION, SOLUTION INTRAVENOUS
Status: DISCONTINUED | OUTPATIENT
Start: 2022-11-28 | End: 2022-11-28

## 2022-11-28 RX ORDER — SODIUM CHLORIDE 0.9 % (FLUSH) 0.9 %
SYRINGE (ML) INJECTION
Status: DISCONTINUED | OUTPATIENT
Start: 2022-11-28 | End: 2022-11-28 | Stop reason: HOSPADM

## 2022-11-28 RX ORDER — MIDAZOLAM HYDROCHLORIDE 1 MG/ML
INJECTION INTRAMUSCULAR; INTRAVENOUS
Status: DISCONTINUED | OUTPATIENT
Start: 2022-11-28 | End: 2022-11-28

## 2022-11-28 RX ORDER — IBUPROFEN 200 MG
16 TABLET ORAL
Status: DISCONTINUED | OUTPATIENT
Start: 2022-11-28 | End: 2022-12-01 | Stop reason: HOSPADM

## 2022-11-28 RX ORDER — DIPHENHYDRAMINE HYDROCHLORIDE 50 MG/ML
12.5 INJECTION INTRAMUSCULAR; INTRAVENOUS EVERY 6 HOURS PRN
Status: DISCONTINUED | OUTPATIENT
Start: 2022-11-28 | End: 2022-12-01 | Stop reason: HOSPADM

## 2022-11-28 RX ORDER — INSULIN ASPART 100 [IU]/ML
0-5 INJECTION, SOLUTION INTRAVENOUS; SUBCUTANEOUS
Status: DISCONTINUED | OUTPATIENT
Start: 2022-11-28 | End: 2022-12-01 | Stop reason: HOSPADM

## 2022-11-28 RX ORDER — HYDROMORPHONE HYDROCHLORIDE 2 MG/ML
1 INJECTION, SOLUTION INTRAMUSCULAR; INTRAVENOUS; SUBCUTANEOUS
Status: DISCONTINUED | OUTPATIENT
Start: 2022-11-28 | End: 2022-12-01 | Stop reason: HOSPADM

## 2022-11-28 RX ORDER — ONDANSETRON 2 MG/ML
8 INJECTION INTRAMUSCULAR; INTRAVENOUS EVERY 8 HOURS PRN
Status: DISCONTINUED | OUTPATIENT
Start: 2022-11-28 | End: 2022-12-01 | Stop reason: HOSPADM

## 2022-11-28 RX ORDER — NALOXONE HCL 0.4 MG/ML
0.02 VIAL (ML) INJECTION
Status: DISCONTINUED | OUTPATIENT
Start: 2022-11-28 | End: 2022-12-01 | Stop reason: HOSPADM

## 2022-11-28 RX ORDER — DEXAMETHASONE SODIUM PHOSPHATE 4 MG/ML
INJECTION, SOLUTION INTRA-ARTICULAR; INTRALESIONAL; INTRAMUSCULAR; INTRAVENOUS; SOFT TISSUE
Status: DISCONTINUED | OUTPATIENT
Start: 2022-11-28 | End: 2022-11-28

## 2022-11-28 RX ORDER — IBUPROFEN 200 MG
24 TABLET ORAL
Status: DISCONTINUED | OUTPATIENT
Start: 2022-11-28 | End: 2022-12-01 | Stop reason: HOSPADM

## 2022-11-28 RX ORDER — LABETALOL HYDROCHLORIDE 5 MG/ML
INJECTION, SOLUTION INTRAVENOUS
Status: DISPENSED
Start: 2022-11-28 | End: 2022-11-29

## 2022-11-28 RX ORDER — ONDANSETRON 2 MG/ML
INJECTION INTRAMUSCULAR; INTRAVENOUS
Status: DISCONTINUED | OUTPATIENT
Start: 2022-11-28 | End: 2022-11-28

## 2022-11-28 RX ORDER — LABETALOL HYDROCHLORIDE 5 MG/ML
10 INJECTION, SOLUTION INTRAVENOUS ONCE
Status: DISCONTINUED | OUTPATIENT
Start: 2022-11-28 | End: 2022-12-01 | Stop reason: HOSPADM

## 2022-11-28 RX ORDER — ONDANSETRON 2 MG/ML
INJECTION INTRAMUSCULAR; INTRAVENOUS
Status: COMPLETED
Start: 2022-11-28 | End: 2022-11-28

## 2022-11-28 RX ORDER — ACETAMINOPHEN 10 MG/ML
INJECTION, SOLUTION INTRAVENOUS
Status: DISCONTINUED | OUTPATIENT
Start: 2022-11-28 | End: 2022-11-28

## 2022-11-28 RX ORDER — GLUCAGON 1 MG
1 KIT INJECTION
Status: DISCONTINUED | OUTPATIENT
Start: 2022-11-28 | End: 2022-12-01 | Stop reason: HOSPADM

## 2022-11-28 RX ORDER — ROCURONIUM BROMIDE 10 MG/ML
INJECTION, SOLUTION INTRAVENOUS
Status: DISCONTINUED | OUTPATIENT
Start: 2022-11-28 | End: 2022-11-28

## 2022-11-28 RX ORDER — FAMOTIDINE 10 MG/ML
INJECTION INTRAVENOUS
Status: COMPLETED
Start: 2022-11-28 | End: 2022-11-28

## 2022-11-28 RX ORDER — GLYCOPYRROLATE 0.2 MG/ML
INJECTION INTRAMUSCULAR; INTRAVENOUS
Status: DISCONTINUED | OUTPATIENT
Start: 2022-11-28 | End: 2022-11-28

## 2022-11-28 RX ORDER — OXYCODONE HYDROCHLORIDE 5 MG/1
5 TABLET ORAL EVERY 4 HOURS PRN
Status: DISCONTINUED | OUTPATIENT
Start: 2022-11-28 | End: 2022-12-01 | Stop reason: HOSPADM

## 2022-11-28 RX ORDER — BUPIVACAINE HYDROCHLORIDE 5 MG/ML
INJECTION, SOLUTION EPIDURAL; INTRACAUDAL
Status: DISCONTINUED | OUTPATIENT
Start: 2022-11-28 | End: 2022-11-28 | Stop reason: HOSPADM

## 2022-11-28 RX ORDER — ALVIMOPAN 12 MG/1
12 CAPSULE ORAL 2 TIMES DAILY
Status: DISCONTINUED | OUTPATIENT
Start: 2022-11-28 | End: 2022-12-01 | Stop reason: HOSPADM

## 2022-11-28 RX ORDER — HYDROMORPHONE HYDROCHLORIDE 2 MG/ML
1 INJECTION, SOLUTION INTRAMUSCULAR; INTRAVENOUS; SUBCUTANEOUS EVERY 4 HOURS PRN
Status: DISCONTINUED | OUTPATIENT
Start: 2022-11-28 | End: 2022-12-01 | Stop reason: HOSPADM

## 2022-11-28 RX ORDER — ENOXAPARIN SODIUM 100 MG/ML
40 INJECTION SUBCUTANEOUS EVERY 24 HOURS
Status: DISCONTINUED | OUTPATIENT
Start: 2022-11-29 | End: 2022-12-01 | Stop reason: HOSPADM

## 2022-11-28 RX ORDER — DIPHENHYDRAMINE HYDROCHLORIDE 50 MG/ML
12.5 INJECTION INTRAMUSCULAR; INTRAVENOUS EVERY 4 HOURS PRN
Status: DISCONTINUED | OUTPATIENT
Start: 2022-11-28 | End: 2022-12-01 | Stop reason: HOSPADM

## 2022-11-28 RX ORDER — SODIUM CHLORIDE, SODIUM LACTATE, POTASSIUM CHLORIDE, CALCIUM CHLORIDE 600; 310; 30; 20 MG/100ML; MG/100ML; MG/100ML; MG/100ML
INJECTION, SOLUTION INTRAVENOUS CONTINUOUS
Status: DISCONTINUED | OUTPATIENT
Start: 2022-11-28 | End: 2022-11-29

## 2022-11-28 RX ORDER — METHOCARBAMOL 100 MG/ML
250 INJECTION, SOLUTION INTRAMUSCULAR; INTRAVENOUS ONCE
Status: COMPLETED | OUTPATIENT
Start: 2022-11-28 | End: 2022-11-28

## 2022-11-28 RX ORDER — EPHEDRINE SULFATE 50 MG/ML
INJECTION, SOLUTION INTRAVENOUS
Status: DISCONTINUED | OUTPATIENT
Start: 2022-11-28 | End: 2022-11-28

## 2022-11-28 RX ORDER — SODIUM CHLORIDE, SODIUM LACTATE, POTASSIUM CHLORIDE, CALCIUM CHLORIDE 600; 310; 30; 20 MG/100ML; MG/100ML; MG/100ML; MG/100ML
INJECTION, SOLUTION INTRAVENOUS CONTINUOUS
Status: ACTIVE | OUTPATIENT
Start: 2022-11-28 | End: 2022-11-28

## 2022-11-28 RX ORDER — FENTANYL CITRATE 50 UG/ML
INJECTION, SOLUTION INTRAMUSCULAR; INTRAVENOUS
Status: DISCONTINUED | OUTPATIENT
Start: 2022-11-28 | End: 2022-11-28

## 2022-11-28 RX ORDER — HYDROMORPHONE HYDROCHLORIDE 2 MG/ML
0.4 INJECTION, SOLUTION INTRAMUSCULAR; INTRAVENOUS; SUBCUTANEOUS EVERY 10 MIN PRN
Status: DISCONTINUED | OUTPATIENT
Start: 2022-11-28 | End: 2022-12-01 | Stop reason: HOSPADM

## 2022-11-28 RX ADMIN — SODIUM CHLORIDE, SODIUM GLUCONATE, SODIUM ACETATE, POTASSIUM CHLORIDE AND MAGNESIUM CHLORIDE: 526; 502; 368; 37; 30 INJECTION, SOLUTION INTRAVENOUS at 12:11

## 2022-11-28 RX ADMIN — INSULIN HUMAN 3 UNITS: 100 INJECTION, SOLUTION PARENTERAL at 10:11

## 2022-11-28 RX ADMIN — ONDANSETRON 4 MG: 2 INJECTION INTRAMUSCULAR; INTRAVENOUS at 01:11

## 2022-11-28 RX ADMIN — EPHEDRINE SULFATE 30 MG: 50 INJECTION INTRAVENOUS at 01:11

## 2022-11-28 RX ADMIN — PROPOFOL 100 MG: 10 INJECTION, EMULSION INTRAVENOUS at 12:11

## 2022-11-28 RX ADMIN — FAMOTIDINE 20 MG: 10 INJECTION INTRAVENOUS at 09:11

## 2022-11-28 RX ADMIN — ROCURONIUM BROMIDE 50 MG: 10 INJECTION, SOLUTION INTRAVENOUS at 01:11

## 2022-11-28 RX ADMIN — ALVIMOPAN 12 MG: 12 CAPSULE ORAL at 10:11

## 2022-11-28 RX ADMIN — ONDANSETRON 4 MG: 2 INJECTION INTRAMUSCULAR; INTRAVENOUS at 06:11

## 2022-11-28 RX ADMIN — FENTANYL CITRATE 25 MCG: 50 INJECTION, SOLUTION INTRAMUSCULAR; INTRAVENOUS at 01:11

## 2022-11-28 RX ADMIN — GLYCOPYRROLATE 0.2 MG: 0.2 INJECTION INTRAMUSCULAR; INTRAVENOUS at 12:11

## 2022-11-28 RX ADMIN — FENTANYL CITRATE 25 MCG: 50 INJECTION, SOLUTION INTRAMUSCULAR; INTRAVENOUS at 02:11

## 2022-11-28 RX ADMIN — HYDROMORPHONE HYDROCHLORIDE 1 MG: 2 INJECTION, SOLUTION INTRAMUSCULAR; INTRAVENOUS; SUBCUTANEOUS at 03:11

## 2022-11-28 RX ADMIN — OXYCODONE 5 MG: 5 TABLET ORAL at 10:11

## 2022-11-28 RX ADMIN — DEXAMETHASONE SODIUM PHOSPHATE 4 MG: 4 INJECTION, SOLUTION INTRA-ARTICULAR; INTRALESIONAL; INTRAMUSCULAR; INTRAVENOUS; SOFT TISSUE at 01:11

## 2022-11-28 RX ADMIN — CLINDAMYCIN PHOSPHATE 600 MG: 900 INJECTION, SOLUTION INTRAVENOUS at 01:11

## 2022-11-28 RX ADMIN — METOPROLOL TARTRATE 2.5 MG: 1 INJECTION, SOLUTION INTRAVENOUS at 03:11

## 2022-11-28 RX ADMIN — SUGAMMADEX 200 MG: 100 INJECTION, SOLUTION INTRAVENOUS at 03:11

## 2022-11-28 RX ADMIN — ROCURONIUM BROMIDE 50 MG: 10 INJECTION, SOLUTION INTRAVENOUS at 12:11

## 2022-11-28 RX ADMIN — MIDAZOLAM HYDROCHLORIDE 2 MG: 1 INJECTION, SOLUTION INTRAMUSCULAR; INTRAVENOUS at 12:11

## 2022-11-28 RX ADMIN — METHOCARBAMOL 250 MG: 100 INJECTION, SOLUTION INTRAMUSCULAR; INTRAVENOUS at 03:11

## 2022-11-28 RX ADMIN — SODIUM CHLORIDE, POTASSIUM CHLORIDE, SODIUM LACTATE AND CALCIUM CHLORIDE: 600; 310; 30; 20 INJECTION, SOLUTION INTRAVENOUS at 06:11

## 2022-11-28 RX ADMIN — ACETAMINOPHEN 1000 MG: 10 INJECTION, SOLUTION INTRAVENOUS at 09:11

## 2022-11-28 RX ADMIN — METHOCARBAMOL 250 MG: 100 INJECTION INTRAMUSCULAR; INTRAVENOUS at 03:11

## 2022-11-28 RX ADMIN — LIDOCAINE HYDROCHLORIDE 40 MG: 20 INJECTION, SOLUTION EPIDURAL; INFILTRATION; INTRACAUDAL; PERINEURAL at 12:11

## 2022-11-28 RX ADMIN — PROMETHAZINE HYDROCHLORIDE 6.25 MG: 25 INJECTION INTRAMUSCULAR; INTRAVENOUS at 03:11

## 2022-11-28 RX ADMIN — ACETAMINOPHEN 1000 MG: 10 INJECTION, SOLUTION INTRAVENOUS at 02:11

## 2022-11-28 RX ADMIN — FAMOTIDINE 20 MG: 10 INJECTION, SOLUTION INTRAVENOUS at 09:11

## 2022-11-28 RX ADMIN — FENTANYL CITRATE 100 MCG: 50 INJECTION, SOLUTION INTRAMUSCULAR; INTRAVENOUS at 12:11

## 2022-11-28 RX ADMIN — ENOXAPARIN SODIUM 30 MG: 30 INJECTION SUBCUTANEOUS at 08:11

## 2022-11-28 NOTE — ANESTHESIA PREPROCEDURE EVALUATION
11/28/2022  Sierra Alva is a 76 y.o., female.  Pre-operative evaluation for Procedure(s) (LRB):  ROBOTIC PANCREATECTOMY (N/A)  EUS per Dr Stearns notes 11/22/2022: an ill-defined mass in the pancreatic tail, FNA was performed and revealed well-differentiated neuroendocrine carcinoma.    Sierra Alva is a 76 y.o. female [employer], refuses BT.    Allergy: Amoxicillin, PCN.  Epinephrine with LA at dental office= palpitations, not an allergy.    No current facility-administered medications on file prior to encounter.     Current Outpatient Medications on File Prior to Encounter   Medication Sig Dispense Refill    ascorbic acid, vitamin C, (VITAMIN C) 500 MG tablet Take 500 mg by mouth once daily.      b complex vitamins tablet Take 1 tablet by mouth once daily.      BIOTIN ORAL Take 1 tablet by mouth Daily.      gabapentin (NEURONTIN) 300 MG capsule Take 300 mg by mouth 2 (two) times daily.      glimepiride (AMARYL) 4 MG tablet Take 4 mg by mouth before breakfast.      glucosamine HCl/chondroitin gresham (GLUCOSAMINE-CHONDROITIN ORAL) Take 1 tablet by mouth Daily.      grape seed extract (GRAPE SEED ORAL) Take 1 capsule by mouth Daily.      hydroCHLOROthiazide (HYDRODIURIL) 25 MG tablet Take 25 mg by mouth once daily.      losartan (COZAAR) 25 MG tablet Take 25 mg by mouth once daily.      MAGNESIUM ORAL Take 1 tablet by mouth Daily.      montelukast (SINGULAIR) 10 mg tablet Take 10 mg by mouth every evening.      NON FORMULARY MEDICATION Take 1 tablet by mouth once daily. [facility]      NON FORMULARY MEDICATION Take 1 tablet by mouth every other day. Liver Detox      omeprazole (PRILOSEC) 20 MG capsule Take 20 mg by mouth once daily.      POTASSIUM ORAL Take 1 tablet by mouth Daily.      sertraline (ZOLOFT) 50 MG tablet Take 50 mg by mouth once daily.      SITagliptin (JANUVIA) 100 MG Tab  Take 100 mg by mouth once daily.      glimepiride (AMARYL) 4 MG tablet Take 4 mg by mouth every morning.      hydroCHLOROthiazide (HYDRODIURIL) 25 MG tablet Take 25 mg by mouth once daily.      JANUVIA 100 mg Tab Take 100 mg by mouth once daily.      losartan (COZAAR) 25 MG tablet Take 25 mg by mouth once daily.      montelukast (SINGULAIR) 10 mg tablet Take 10 mg by mouth once daily.      omeprazole (PRILOSEC) 20 MG capsule Take 20 mg by mouth once daily.      sertraline (ZOLOFT) 50 MG tablet Take 50 mg by mouth once daily.         Past Surgical History:   Procedure Laterality Date    COLECTOMY      COLONOSCOPY      LOW ANTERIOR RESECTION OF COLON      Permanent Ostomy    MEDIPORT INSERTION, SINGLE      Removal Wart Hand      Wart Removed from Wrist           CBC 11/22/2022: H/H= 12/37     No results for input(s): WBC, RBC, HGB, HCT, PLT, MCV, MCH, MCHC in the last 72 hours.    CMP 11/22/2022:K=4, Cr=0.85, eGFR WNL. LFT: WNL     No results for input(s): NA, K, CL, CO2, BUN, CREATININE, GLU, MG, PHOS, CALCIUM, ALBUMIN, PROT, ALKPHOS, ALT, AST, BILITOT in the last 72 hours.    INR  No results for input(s): PT, INR, PROTIME, APTT in the last 72 hours.      Diagnostic Studies:  CXR 11/16/2022 : NAPD    EKG 11/22/2022 : NSR=68. w PVC      2D Echo :  No results found for this or any previous visit.      Pre-op Assessment    I have reviewed the Patient Summary Reports.     I have reviewed the Nursing Notes. I have reviewed the NPO Status.   I have reviewed the Medications.     Review of Systems  Anesthesia Hx:  No problems with previous Anesthesia  History of prior surgery of interest to airway management or planning: Previous anesthesia: MAC, General TIVA EUS FNA, Colon Resection 5/2020 with general anesthesia.   L CW port and exc wart hand.  with MAC.  Denies Family Hx of Anesthesia complications.   Denies Personal Hx of Anesthesia complications.   Social:  Non-Smoker, Social Alcohol Use     Hematology/Oncology:         -- Cancer in past history: surgery, chemotherapy and radiation  Oncology Comments: Col0pn CA s/p resection, chemo and RTX.     EENT/Dental:   Eyes: Denies Glaucoma.   Cardiovascular:   Exercise tolerance: poor Denies Pacemaker. Hypertension  Denies MI.   Denies CABG/stent.   Denies Angina. no hyperlipidemia ECG has been reviewed. Walker  Sec to knee and hip and LBP.   Pulmonary:   Asthma No wheezing since Singulair. Does not carry an inhaler. Last asthma attack: 10 yrs. No ER/Hosp.   Renal/:  Renal/ Normal     Hepatic/GI:   Bowel Prep. GERD, well controlled PPI taken MOS.  Peristomal hernia.   Musculoskeletal:   Arthritis   Spine Disorders: lumbar Chronic Pain    Neurological:   Denies TIA. Denies CVA. Denies Seizures.   Peripheral Neuropathy    Endocrine:   Diabetes, type 2 213: RI 2untis SQgiven preop. Recheck an hour Obesity / BMI > 30  Psych:   anxiety depression          Physical Exam  General: Well nourished, Cooperative, Alert, Oriented and Anxious    Airway:  Mallampati: III / III  Mouth Opening: Normal  TM Distance: Normal  Tongue: Normal  Neck ROM: Normal ROM    Dental:  Intact    Musculoskeletal:Pulse radial strong and ulnar perfusion bilat good  With Allens sign.      Anesthesia Plan  Type of Anesthesia, risks & benefits discussed:    Anesthesia Type: Gen ETT  Intra-op Monitoring Plan: Standard ASA Monitors and Art Line  Post Op Pain Control Plan: multimodal analgesia  Induction:  IV  Airway Plan: Direct  Informed Consent: Informed consent signed with the Patient and all parties understand the risks and agree with anesthesia plan.  All questions answered.   ASA Score: 3  Day of Surgery Review of History & Physical: H&P Update referred to the surgeon/provider.I have interviewed and examined the patient. I have reviewed the patient's H&P dated: 11/22/2022.   Anesthesia Plan Notes: 2 large bore PIVs and art line. Check FBS 1 hr after RI.    Ready For Surgery From  Anesthesia Perspective.     .

## 2022-11-28 NOTE — TRANSFER OF CARE
"Anesthesia Transfer of Care Note    Patient: Sierra Alva    Procedure(s) Performed: Procedure(s) (LRB):  ROBOTIC PANCREATECTOMY (N/A)    Patient location: PACU    Anesthesia Type: general    Transport from OR: Transported from OR on room air with adequate spontaneous ventilation    Post pain: adequate analgesia    Post assessment: no apparent anesthetic complications    Post vital signs: stable    Level of consciousness: awake and alert    Nausea/Vomiting: no nausea/vomiting    Complications: none    Transfer of care protocol was followed      Last vitals:   Visit Vitals  BP (!) 167/97   Pulse 70   Temp 36.2 °C (97.2 °F)   Resp 20   Ht 5' 5" (1.651 m)   Wt 102.1 kg (225 lb)   LMP  (LMP Unknown)   SpO2 96%   BMI 37.44 kg/m²     "

## 2022-11-28 NOTE — ANESTHESIA PROCEDURE NOTES
Arterial    Diagnosis: need for monitor    Patient location during procedure: done in OR  Procedure start time: 11/28/2022 1:01 PM  Timeout: 11/28/2022 1:01 PM  Procedure end time: 11/28/2022 1:04 PM    Staffing  Authorizing Provider: Izabella Peralta MD  Performing Provider: Consuelo Willard      Preanesthetic Checklist  Completed: patient identified, IV checked, site marked, risks and benefits discussed, surgical consent, monitors and equipment checked, pre-op evaluation, timeout performed and anesthesia consent givenArterial  Skin Prep: chlorhexidine gluconate  Orientation: right  Location: radial    Catheter Size: 20 G  Catheter placement by Anatomical landmarks. Heme positive aspiration all ports. Insertion Attempts: 1  Assessment  Dressing: secured with tape and tegaderm  Patient: Tolerated well

## 2022-11-28 NOTE — ANESTHESIA PROCEDURE NOTES
Intubation    Date/Time: 11/28/2022 1:01 PM  Performed by: Consuelo Willard  Authorized by: Izabella Peralta MD     Intubation:     Induction:  Intravenous    Intubated:  Postinduction    Mask Ventilation:  Easy with oral airway    Attempts:  2    Attempted By:  Student    Method of Intubation:  Direct    Blade:  Gerard 2    Laryngeal View Grade: Grade III - only epiglottis visible      Attempted By (2nd Attempt):  Student    Method of Intubation (2nd Attempt):  Video laryngoscopy    Blade (2nd Attempt):  Schafer 3    Laryngeal View Grade (2nd Attempt): Grade I - full view of cords      Difficult Airway Encountered?: No      Complications:  None    Airway Device:  Oral endotracheal tube    Airway Device Size:  7.5    Style/Cuff Inflation:  Cuffed (inflated to minimal occlusive pressure)    Inflation Amount (mL):  6    Tube secured:  22    Secured at:  The lips    Placement Verified By:  Capnometry    Complicating Factors:  Anterior larynx    Findings Post-Intubation:  BS equal bilateral and atraumatic/condition of teeth unchanged

## 2022-11-29 LAB
ALBUMIN SERPL-MCNC: 3.5 GM/DL (ref 3.4–4.8)
ALBUMIN/GLOB SERPL: 1.3 RATIO (ref 1.1–2)
ALP SERPL-CCNC: 102 UNIT/L (ref 40–150)
ALT SERPL-CCNC: 22 UNIT/L (ref 0–55)
AMYLASE BODY FLUID (IN HOUSE): 2033 U/L
APTT PPP: 26.2 SECONDS (ref 23.2–33.7)
AST SERPL-CCNC: 23 UNIT/L (ref 5–34)
BASOPHILS # BLD AUTO: 0.01 X10(3)/MCL (ref 0–0.2)
BASOPHILS NFR BLD AUTO: 0.1 %
BILIRUBIN DIRECT+TOT PNL SERPL-MCNC: 0.6 MG/DL
BUN SERPL-MCNC: 11.7 MG/DL (ref 9.8–20.1)
CALCIUM SERPL-MCNC: 8.3 MG/DL (ref 8.4–10.2)
CHLORIDE SERPL-SCNC: 103 MMOL/L (ref 98–107)
CO2 SERPL-SCNC: 21 MMOL/L (ref 23–31)
CREAT SERPL-MCNC: 0.79 MG/DL (ref 0.55–1.02)
EOSINOPHIL # BLD AUTO: 0 X10(3)/MCL (ref 0–0.9)
EOSINOPHIL NFR BLD AUTO: 0 %
ERYTHROCYTE [DISTWIDTH] IN BLOOD BY AUTOMATED COUNT: 12.4 % (ref 11.5–17)
GFR SERPLBLD CREATININE-BSD FMLA CKD-EPI: >60 MLS/MIN/1.73/M2
GLOBULIN SER-MCNC: 2.8 GM/DL (ref 2.4–3.5)
GLUCOSE SERPL-MCNC: 325 MG/DL (ref 82–115)
HCT VFR BLD AUTO: 43.8 % (ref 37–47)
HGB BLD-MCNC: 14.3 GM/DL (ref 12–16)
IMM GRANULOCYTES # BLD AUTO: 0.05 X10(3)/MCL (ref 0–0.04)
IMM GRANULOCYTES NFR BLD AUTO: 0.5 %
INR BLD: 1.04 (ref 0–1.3)
LYMPHOCYTES # BLD AUTO: 0.79 X10(3)/MCL (ref 0.6–4.6)
LYMPHOCYTES NFR BLD AUTO: 7.8 %
MAGNESIUM SERPL-MCNC: 1.9 MG/DL (ref 1.6–2.6)
MCH RBC QN AUTO: 29.5 PG (ref 27–31)
MCHC RBC AUTO-ENTMCNC: 32.6 MG/DL (ref 33–36)
MCV RBC AUTO: 90.3 FL (ref 80–94)
MONOCYTES # BLD AUTO: 0.67 X10(3)/MCL (ref 0.1–1.3)
MONOCYTES NFR BLD AUTO: 6.6 %
NEUTROPHILS # BLD AUTO: 8.6 X10(3)/MCL (ref 2.1–9.2)
NEUTROPHILS NFR BLD AUTO: 85 %
NRBC BLD AUTO-RTO: 0 %
PLATELET # BLD AUTO: 134 X10(3)/MCL (ref 130–400)
PMV BLD AUTO: 12.9 FL (ref 7.4–10.4)
POCT GLUCOSE: 254 MG/DL (ref 70–110)
POCT GLUCOSE: 285 MG/DL (ref 70–110)
POCT GLUCOSE: 288 MG/DL (ref 70–110)
POTASSIUM SERPL-SCNC: 4.2 MMOL/L (ref 3.5–5.1)
PROT SERPL-MCNC: 6.3 GM/DL (ref 5.8–7.6)
PROTHROMBIN TIME: 13.5 SECONDS (ref 12.5–14.5)
RBC # BLD AUTO: 4.85 X10(6)/MCL (ref 4.2–5.4)
SODIUM SERPL-SCNC: 137 MMOL/L (ref 136–145)
WBC # SPEC AUTO: 10.1 X10(3)/MCL (ref 4.5–11.5)

## 2022-11-29 PROCEDURE — 25000003 PHARM REV CODE 250: Performed by: NURSE PRACTITIONER

## 2022-11-29 PROCEDURE — 25000003 PHARM REV CODE 250: Performed by: ANESTHESIOLOGY

## 2022-11-29 PROCEDURE — 11000001 HC ACUTE MED/SURG PRIVATE ROOM

## 2022-11-29 PROCEDURE — 82150 ASSAY OF AMYLASE: CPT | Performed by: SURGERY

## 2022-11-29 PROCEDURE — 63600175 PHARM REV CODE 636 W HCPCS: Performed by: NURSE PRACTITIONER

## 2022-11-29 PROCEDURE — 85025 COMPLETE CBC W/AUTO DIFF WBC: CPT | Performed by: NURSE PRACTITIONER

## 2022-11-29 PROCEDURE — 83735 ASSAY OF MAGNESIUM: CPT | Performed by: NURSE PRACTITIONER

## 2022-11-29 PROCEDURE — 36415 COLL VENOUS BLD VENIPUNCTURE: CPT | Performed by: NURSE PRACTITIONER

## 2022-11-29 PROCEDURE — 85610 PROTHROMBIN TIME: CPT | Performed by: NURSE PRACTITIONER

## 2022-11-29 PROCEDURE — 80053 COMPREHEN METABOLIC PANEL: CPT | Performed by: NURSE PRACTITIONER

## 2022-11-29 PROCEDURE — 85730 THROMBOPLASTIN TIME PARTIAL: CPT | Performed by: NURSE PRACTITIONER

## 2022-11-29 PROCEDURE — 27000221 HC OXYGEN, UP TO 24 HOURS

## 2022-11-29 RX ORDER — PROMETHAZINE HYDROCHLORIDE 25 MG/ML
6.25 INJECTION, SOLUTION INTRAMUSCULAR; INTRAVENOUS ONCE AS NEEDED
Status: COMPLETED | OUTPATIENT
Start: 2022-11-29 | End: 2022-11-29

## 2022-11-29 RX ORDER — SIMETHICONE 80 MG
1 TABLET,CHEWABLE ORAL 3 TIMES DAILY PRN
Status: DISCONTINUED | OUTPATIENT
Start: 2022-11-29 | End: 2022-12-01 | Stop reason: HOSPADM

## 2022-11-29 RX ADMIN — OXYCODONE 5 MG: 5 TABLET ORAL at 05:11

## 2022-11-29 RX ADMIN — INSULIN ASPART 3 UNITS: 100 INJECTION, SOLUTION INTRAVENOUS; SUBCUTANEOUS at 11:11

## 2022-11-29 RX ADMIN — SIMETHICONE 80 MG: 80 TABLET, CHEWABLE ORAL at 12:11

## 2022-11-29 RX ADMIN — FAMOTIDINE 20 MG: 10 INJECTION, SOLUTION INTRAVENOUS at 08:11

## 2022-11-29 RX ADMIN — OXYCODONE 5 MG: 5 TABLET ORAL at 10:11

## 2022-11-29 RX ADMIN — INSULIN ASPART 4 UNITS: 100 INJECTION, SOLUTION INTRAVENOUS; SUBCUTANEOUS at 03:11

## 2022-11-29 RX ADMIN — ALVIMOPAN 12 MG: 12 CAPSULE ORAL at 10:11

## 2022-11-29 RX ADMIN — ENOXAPARIN SODIUM 40 MG: 40 INJECTION SUBCUTANEOUS at 05:11

## 2022-11-29 RX ADMIN — FAMOTIDINE 20 MG: 10 INJECTION, SOLUTION INTRAVENOUS at 10:11

## 2022-11-29 RX ADMIN — PROMETHAZINE HYDROCHLORIDE 25 MG: 25 TABLET ORAL at 10:11

## 2022-11-29 RX ADMIN — ONDANSETRON 8 MG: 2 INJECTION INTRAMUSCULAR; INTRAVENOUS at 02:11

## 2022-11-29 RX ADMIN — HYDROMORPHONE HYDROCHLORIDE 1 MG: 2 INJECTION INTRAMUSCULAR; INTRAVENOUS; SUBCUTANEOUS at 04:11

## 2022-11-29 RX ADMIN — ACETAMINOPHEN 1000 MG: 10 INJECTION, SOLUTION INTRAVENOUS at 03:11

## 2022-11-29 RX ADMIN — INSULIN ASPART 3 UNITS: 100 INJECTION, SOLUTION INTRAVENOUS; SUBCUTANEOUS at 05:11

## 2022-11-29 RX ADMIN — PROMETHAZINE HYDROCHLORIDE 6.25 MG: 25 INJECTION INTRAMUSCULAR; INTRAVENOUS at 03:11

## 2022-11-29 RX ADMIN — INSULIN ASPART 1 UNITS: 100 INJECTION, SOLUTION INTRAVENOUS; SUBCUTANEOUS at 08:11

## 2022-11-29 RX ADMIN — ONDANSETRON 8 MG: 2 INJECTION INTRAMUSCULAR; INTRAVENOUS at 11:11

## 2022-11-29 RX ADMIN — ALVIMOPAN 12 MG: 12 CAPSULE ORAL at 08:11

## 2022-11-29 NOTE — PROGRESS NOTES
SURG ONC POD 1    PT APPEARS TO BE DOING WELL  PAIN CONTROLLED  MILD NAUSEA    ABD SOFT  DRESSINGS CLEAN AND DRY  DRAIN THIN, PINK, 135CC/NIGHTS  UO MORE THAN ADEQUATE    WBC 11512   14/43  LYTES REVIEWED  GLUCOSE 325, HX DM, ON SLIDING SCALE    PLAN  HL IV  CLEARS FOR NOW  DC GRANADO  MOBILIZE  DRAIN AMYLASE

## 2022-11-29 NOTE — ANESTHESIA POSTPROCEDURE EVALUATION
Anesthesia Post Evaluation    Patient: Sierra Alva    Procedure(s) Performed: Procedure(s) (LRB):  ROBOTIC PANCREATECTOMY (N/A)    Final Anesthesia Type: general      Patient location during evaluation: PACU  Patient participation: Yes- Able to Participate  Level of consciousness: awake and alert  Post-procedure vital signs: reviewed and stable  KARINA mitigation strategies: Multimodal analgesia    Anesthetic complications: no      Cardiovascular status: stable  Respiratory status: unassisted  Hydration status: euvolemic  Follow-up not needed.          Vitals Value Taken Time   /83 11/29/22 1201   Temp 37 11/29/22 1513   Pulse 85 11/29/22 0932   Resp 18 11/29/22 1000   SpO2 96 % 11/29/22 0930   Vitals shown include unvalidated device data.      Event Time   Out of Recovery 18:30:00         Pain/Abhishek Score: Pain Rating Prior to Med Admin: 6 (11/29/2022 10:00 AM)  Abhishek Score: 10 (11/29/2022  2:00 PM)

## 2022-11-30 LAB
ALBUMIN SERPL-MCNC: 3.3 GM/DL (ref 3.4–4.8)
ALBUMIN/GLOB SERPL: 1.2 RATIO (ref 1.1–2)
ALP SERPL-CCNC: 92 UNIT/L (ref 40–150)
ALT SERPL-CCNC: 22 UNIT/L (ref 0–55)
APTT PPP: 32.5 SECONDS (ref 23.2–33.7)
AST SERPL-CCNC: 28 UNIT/L (ref 5–34)
BASOPHILS # BLD AUTO: 0.03 X10(3)/MCL (ref 0–0.2)
BASOPHILS NFR BLD AUTO: 0.2 %
BILIRUBIN DIRECT+TOT PNL SERPL-MCNC: 0.7 MG/DL
BUN SERPL-MCNC: 10.4 MG/DL (ref 9.8–20.1)
CALCIUM SERPL-MCNC: 8.5 MG/DL (ref 8.4–10.2)
CHLORIDE SERPL-SCNC: 102 MMOL/L (ref 98–107)
CO2 SERPL-SCNC: 25 MMOL/L (ref 23–31)
CREAT SERPL-MCNC: 0.76 MG/DL (ref 0.55–1.02)
EOSINOPHIL # BLD AUTO: 0 X10(3)/MCL (ref 0–0.9)
EOSINOPHIL NFR BLD AUTO: 0 %
ERYTHROCYTE [DISTWIDTH] IN BLOOD BY AUTOMATED COUNT: 12.9 % (ref 11.5–17)
GFR SERPLBLD CREATININE-BSD FMLA CKD-EPI: >60 MLS/MIN/1.73/M2
GLOBULIN SER-MCNC: 2.8 GM/DL (ref 2.4–3.5)
GLUCOSE SERPL-MCNC: 274 MG/DL (ref 82–115)
HCT VFR BLD AUTO: 41 % (ref 37–47)
HGB BLD-MCNC: 13.5 GM/DL (ref 12–16)
IMM GRANULOCYTES # BLD AUTO: 0.16 X10(3)/MCL (ref 0–0.04)
IMM GRANULOCYTES NFR BLD AUTO: 0.8 %
INR BLD: 1.11 (ref 0–1.3)
LYMPHOCYTES # BLD AUTO: 1.16 X10(3)/MCL (ref 0.6–4.6)
LYMPHOCYTES NFR BLD AUTO: 6.1 %
MAGNESIUM SERPL-MCNC: 2.1 MG/DL (ref 1.6–2.6)
MCH RBC QN AUTO: 30.1 PG (ref 27–31)
MCHC RBC AUTO-ENTMCNC: 32.9 MG/DL (ref 33–36)
MCV RBC AUTO: 91.3 FL (ref 80–94)
MONOCYTES # BLD AUTO: 1.53 X10(3)/MCL (ref 0.1–1.3)
MONOCYTES NFR BLD AUTO: 8 %
NEUTROPHILS # BLD AUTO: 16.1 X10(3)/MCL (ref 2.1–9.2)
NEUTROPHILS NFR BLD AUTO: 84.9 %
NRBC BLD AUTO-RTO: 0 %
PLATELET # BLD AUTO: 117 X10(3)/MCL (ref 130–400)
PMV BLD AUTO: 13.6 FL (ref 7.4–10.4)
POCT GLUCOSE: 175 MG/DL (ref 70–110)
POCT GLUCOSE: 257 MG/DL (ref 70–110)
POCT GLUCOSE: 269 MG/DL (ref 70–110)
POCT GLUCOSE: 303 MG/DL (ref 70–110)
POTASSIUM SERPL-SCNC: 3.9 MMOL/L (ref 3.5–5.1)
PROT SERPL-MCNC: 6.1 GM/DL (ref 5.8–7.6)
PROTHROMBIN TIME: 14.2 SECONDS (ref 12.5–14.5)
RBC # BLD AUTO: 4.49 X10(6)/MCL (ref 4.2–5.4)
SODIUM SERPL-SCNC: 138 MMOL/L (ref 136–145)
WBC # SPEC AUTO: 19 X10(3)/MCL (ref 4.5–11.5)

## 2022-11-30 PROCEDURE — 25000003 PHARM REV CODE 250: Performed by: ANESTHESIOLOGY

## 2022-11-30 PROCEDURE — 11000001 HC ACUTE MED/SURG PRIVATE ROOM

## 2022-11-30 PROCEDURE — 63600175 PHARM REV CODE 636 W HCPCS: Performed by: NURSE PRACTITIONER

## 2022-11-30 PROCEDURE — 85730 THROMBOPLASTIN TIME PARTIAL: CPT | Performed by: NURSE PRACTITIONER

## 2022-11-30 PROCEDURE — 85610 PROTHROMBIN TIME: CPT | Performed by: NURSE PRACTITIONER

## 2022-11-30 PROCEDURE — 94799 UNLISTED PULMONARY SVC/PX: CPT

## 2022-11-30 PROCEDURE — 99900035 HC TECH TIME PER 15 MIN (STAT)

## 2022-11-30 PROCEDURE — 27000221 HC OXYGEN, UP TO 24 HOURS

## 2022-11-30 PROCEDURE — 36415 COLL VENOUS BLD VENIPUNCTURE: CPT | Performed by: NURSE PRACTITIONER

## 2022-11-30 PROCEDURE — 25000003 PHARM REV CODE 250: Performed by: NURSE PRACTITIONER

## 2022-11-30 PROCEDURE — 80053 COMPREHEN METABOLIC PANEL: CPT | Performed by: NURSE PRACTITIONER

## 2022-11-30 PROCEDURE — 85025 COMPLETE CBC W/AUTO DIFF WBC: CPT | Performed by: NURSE PRACTITIONER

## 2022-11-30 PROCEDURE — 83735 ASSAY OF MAGNESIUM: CPT | Performed by: NURSE PRACTITIONER

## 2022-11-30 RX ORDER — ACETAMINOPHEN 10 MG/ML
1000 INJECTION, SOLUTION INTRAVENOUS EVERY 8 HOURS
Status: DISCONTINUED | OUTPATIENT
Start: 2022-11-30 | End: 2022-11-30

## 2022-11-30 RX ORDER — GLIMEPIRIDE 4 MG/1
4 TABLET ORAL EVERY MORNING
Status: DISCONTINUED | OUTPATIENT
Start: 2022-11-30 | End: 2022-12-01 | Stop reason: HOSPADM

## 2022-11-30 RX ORDER — ACETAMINOPHEN 10 MG/ML
1000 INJECTION, SOLUTION INTRAVENOUS EVERY 8 HOURS
Status: COMPLETED | OUTPATIENT
Start: 2022-11-30 | End: 2022-12-01

## 2022-11-30 RX ADMIN — INSULIN ASPART 3 UNITS: 100 INJECTION, SOLUTION INTRAVENOUS; SUBCUTANEOUS at 06:11

## 2022-11-30 RX ADMIN — ALVIMOPAN 12 MG: 12 CAPSULE ORAL at 08:11

## 2022-11-30 RX ADMIN — ACETAMINOPHEN 1000 MG: 10 INJECTION, SOLUTION INTRAVENOUS at 03:11

## 2022-11-30 RX ADMIN — OXYCODONE 5 MG: 5 TABLET ORAL at 06:11

## 2022-11-30 RX ADMIN — GLIMEPIRIDE 4 MG: 4 TABLET ORAL at 10:11

## 2022-11-30 RX ADMIN — SITAGLIPTIN 100 MG: 25 TABLET, FILM COATED ORAL at 10:11

## 2022-11-30 RX ADMIN — FAMOTIDINE 20 MG: 10 INJECTION, SOLUTION INTRAVENOUS at 08:11

## 2022-11-30 RX ADMIN — ACETAMINOPHEN 1000 MG: 10 INJECTION, SOLUTION INTRAVENOUS at 11:11

## 2022-11-30 RX ADMIN — OXYCODONE 5 MG: 5 TABLET ORAL at 01:11

## 2022-11-30 RX ADMIN — INSULIN ASPART 4 UNITS: 100 INJECTION, SOLUTION INTRAVENOUS; SUBCUTANEOUS at 12:11

## 2022-11-30 NOTE — PROGRESS NOTES
"Inpatient Nutrition Evaluation    Admit Date: 11/28/2022   Total duration of encounter: 2 days    Nutrition Recommendation/Prescription     Continue to advance diet as tolerated to GI Soft/Diabetic diet.  Continue medical management of blood sugars.    Nutrition Assessment     Chart Review    Reason Seen: continuous nutrition monitoring    Diagnosis:  pancreatic neuroendocrine cancer   laparoscopic/robotic distal pancreatectomy/splenectomy, intraoperative ultrasound    Relevant Medical History: Abdominal hernia, Abdominal pain, Abnormal findings on radiological examination of gastrointestinal tract, Anxiety, Arthritis, Asthma, Colon cancer, Colostomy in place, Diabetes mellitus, Diabetes mellitus, type 2, GERD (gastroesophageal reflux disease), Hypertension, Hypotension, iatrogenic (1965), IBS (irritable bowel syndrome), Neuropathy, Obesity, Osteoarthritis, Other intra-abdominal and pelvic swelling, mass and lump, Shingles (2019), and Walker as ambulation aid.  Low anterior resection of colon; Wart Removed from Wrist; Colectomy; Colonoscopy; Removal Wart Hand; and Mediport insertion, single.        Nutrition-Related Medications: clindamycin, glimepiride, SSI    Nutrition-Related Labs:  11/30: WBC-19.0, Gluc-274, CBG's 254-303    Diet Order: Diet diabetic Full Liquid  Oral Supplement Order: none  Appetite/Oral Intake: fair/25-50% of meals  Factors Affecting Nutritional Intake: altered gastrointestinal function  Food/Druze/Cultural Preferences: none reported  Food Allergies: none reported    Skin Integrity: drain/device(s), incision  Wound(s):   sx incision    Comments    11/30: Pt noted no nausea or vomiting today. Able to tolerate pain well today. MD will advance diet as bowel function returns. Tolerating liquids well.     Anthropometrics    Height: 5' 5" (165.1 cm) Height Method: Stated  Last Weight: 102.1 kg (225 lb) (11/29/22 1646) Weight Method: Bed Scale  BMI (Calculated): 37.4  BMI Classification: obese " grade II (BMI 35-39.9)     Ideal Body Weight (IBW), Female: 125 lb     % Ideal Body Weight, Female (lb): 180 %                             Usual Weight Provided By: EMR weight history    Wt Readings from Last 5 Encounters:   11/29/22 102.1 kg (225 lb)   11/22/22 103.1 kg (227 lb 6.4 oz)   10/26/22 102.5 kg (226 lb)   10/19/22 103 kg (227 lb)   08/24/22 103.6 kg (228 lb 6.4 oz)     Weight Change(s) Since Admission:  Admit Weight: 102.1 kg (225 lb) (11/16/22 1501)  11/30: 102.1kg    Patient Education    Not applicable.    Monitoring & Evaluation     Dietitian will monitor food and beverage intake.  Nutrition Risk/Follow-Up: low (follow-up in 5-7 days)  Patients assigned 'low nutrition risk' status do not qualify for a full nutritional assessment but will be monitored and re-evaluated in a 5-7 day time period. Please consult if re-evaluation needed sooner.

## 2022-11-30 NOTE — PLAN OF CARE
Problem: Adult Inpatient Plan of Care  Goal: Plan of Care Review  Outcome: Ongoing, Progressing  Goal: Patient-Specific Goal (Individualized)  Outcome: Ongoing, Progressing  Goal: Absence of Hospital-Acquired Illness or Injury  Outcome: Ongoing, Progressing  Intervention: Identify and Manage Fall Risk  Flowsheets (Taken 11/30/2022 0748)  Safety Promotion/Fall Prevention:   assistive device/personal item within reach   side rails raised x 2   instructed to call staff for mobility   nonskid shoes/socks when out of bed  Intervention: Prevent Skin Injury  Flowsheets (Taken 11/30/2022 0748)  Body Position:   position changed independently   weight shifting  Intervention: Prevent and Manage VTE (Venous Thromboembolism) Risk  Flowsheets (Taken 11/30/2022 0748)  Activity Management:   Ambulated in room - L4   Walk with assistive devise and /or staff member - L3  VTE Prevention/Management: remove, assess skin, and reapply foot pump  Goal: Optimal Comfort and Wellbeing  Outcome: Ongoing, Progressing  Intervention: Monitor Pain and Promote Comfort  Flowsheets (Taken 11/30/2022 0748)  Pain Management Interventions: around-the-clock dosing utilized  Intervention: Provide Person-Centered Care  Flowsheets (Taken 11/30/2022 0748)  Trust Relationship/Rapport:   care explained   questions encouraged   questions answered  Goal: Readiness for Transition of Care  Outcome: Ongoing, Progressing     Problem: Infection  Goal: Absence of Infection Signs and Symptoms  Outcome: Ongoing, Progressing     Problem: Skin Injury Risk Increased  Goal: Skin Health and Integrity  Outcome: Ongoing, Progressing     Problem: Fall Injury Risk  Goal: Absence of Fall and Fall-Related Injury  Outcome: Ongoing, Progressing

## 2022-11-30 NOTE — PROGRESS NOTES
SURG ONC POD 2    PT LOOKS MUCH BETTER TODAY  NAUSEA HAS IMPROVED GREATLY  TOLERATING CLEARS  SORENESS AS EXPECTED    ABD SOFT  DRESSINGS REMOVED  ALL INCISIONS HEALING WELL  DRAIN SEROSANG    VSS; NO FEVER  WBC 47830   13/41  LYCONG REVIEWED  GLUCOSE SOMEWHAT IMPROVED BUT STILL HIGH   SHE HAS HISTORY OF DM ON MULTIPLE MEDS AT HOME    PLAN  DIABETIC FULLS  RESTART HOME MEDS  DC HOME TOMORROW IF ALL OK

## 2022-11-30 NOTE — PLAN OF CARE
11/30/22 0857   Discharge Assessment   Assessment Type Discharge Planning Assessment   Confirmed/corrected address, phone number and insurance Yes   Confirmed Demographics Correct on Facesheet   Source of Information patient   When was your last doctors appointment?   (Patient reports November 10th of this year.)   Communicated MARIBEL with patient/caregiver Yes   Reason For Admission Neuroendocrine Carcinoma   Lives With spouse;child(leonardo), adult   Do you expect to return to your current living situation? Yes   Do you have help at home or someone to help you manage your care at home? Yes   Who are your caregiver(s) and their phone number(s)? Spouse: Deondre Ariaspard: [phone]   Prior to hospitilization cognitive status: Unable to Assess   Current cognitive status: Alert/Oriented   Walking or Climbing Stairs Difficulty ambulation difficulty, requires equipment   Dressing/Bathing Difficulty bathing difficulty, assistance 1 person   Home Accessibility wheelchair accessible   Home Layout Able to live on 1st floor   Equipment Currently Used at Home glucometer;walker, rolling   Readmission within 30 days? No   Patient currently being followed by outpatient case management? No   Do you currently have service(s) that help you manage your care at home? No   Do you take prescription medications? Yes   Do you have prescription coverage? Yes   Coverage Medicare Part A & B   Do you have any problems affording any of your prescribed medications? No   Is the patient taking medications as prescribed? yes   Who is going to help you get home at discharge? Spouse   How do you get to doctors appointments? family or friend will provide   Are you on dialysis? No   Do you take coumadin? No   Discharge Plan A Home with family   Discharge Plan B Home   DME Needed Upon Discharge  none   Discharge Plan discussed with: Patient   Discharge Barriers Identified None   Relationship/Environment   Name(s) of Who Lives With Patient Patient resides in  a home with her spouse and adult child, Kirk.     Patient with family support. Patient reports she has a total of 12 children.  No barriers to discharge at this time.

## 2022-11-30 NOTE — PLAN OF CARE
Problem: Adult Inpatient Plan of Care  Goal: Plan of Care Review  Outcome: Ongoing, Progressing  Flowsheets (Taken 11/29/2022 1951)  Plan of Care Reviewed With: patient  Goal: Patient-Specific Goal (Individualized)  Outcome: Ongoing, Progressing  Goal: Absence of Hospital-Acquired Illness or Injury  Outcome: Ongoing, Progressing  Intervention: Identify and Manage Fall Risk  Flowsheets (Taken 11/29/2022 1951)  Safety Promotion/Fall Prevention:   assistive device/personal item within reach   side rails raised x 2   nonskid shoes/socks when out of bed  Intervention: Prevent and Manage VTE (Venous Thromboembolism) Risk  Flowsheets (Taken 11/29/2022 1951)  VTE Prevention/Management: bleeding risk assessed  Intervention: Prevent Infection  Flowsheets (Taken 11/29/2022 1951)  Infection Prevention:   hand hygiene promoted   single patient room provided   rest/sleep promoted  Goal: Optimal Comfort and Wellbeing  Outcome: Ongoing, Progressing  Intervention: Monitor Pain and Promote Comfort  Flowsheets (Taken 11/29/2022 1951)  Pain Management Interventions: care clustered  Goal: Readiness for Transition of Care  Outcome: Ongoing, Progressing     Problem: Infection  Goal: Absence of Infection Signs and Symptoms  Outcome: Ongoing, Progressing     Problem: Skin Injury Risk Increased  Goal: Skin Health and Integrity  Outcome: Ongoing, Progressing     Problem: Fall Injury Risk  Goal: Absence of Fall and Fall-Related Injury  Outcome: Ongoing, Progressing

## 2022-12-01 VITALS
BODY MASS INDEX: 37.49 KG/M2 | HEART RATE: 75 BPM | RESPIRATION RATE: 18 BRPM | SYSTOLIC BLOOD PRESSURE: 145 MMHG | WEIGHT: 225 LBS | TEMPERATURE: 98 F | HEIGHT: 65 IN | DIASTOLIC BLOOD PRESSURE: 86 MMHG | OXYGEN SATURATION: 94 %

## 2022-12-01 PROBLEM — K86.89 PANCREATIC MASS: Status: ACTIVE | Noted: 2022-12-01

## 2022-12-01 LAB
ALBUMIN SERPL-MCNC: 2.9 GM/DL (ref 3.4–4.8)
ALBUMIN/GLOB SERPL: 0.9 RATIO (ref 1.1–2)
ALP SERPL-CCNC: 83 UNIT/L (ref 40–150)
ALT SERPL-CCNC: 19 UNIT/L (ref 0–55)
APTT PPP: 22.4 SECONDS (ref 23.2–33.7)
AST SERPL-CCNC: 18 UNIT/L (ref 5–34)
BASOPHILS # BLD AUTO: 0.05 X10(3)/MCL (ref 0–0.2)
BASOPHILS NFR BLD AUTO: 0.3 %
BILIRUBIN DIRECT+TOT PNL SERPL-MCNC: 0.8 MG/DL
BUN SERPL-MCNC: 9.3 MG/DL (ref 9.8–20.1)
CALCIUM SERPL-MCNC: 8.8 MG/DL (ref 8.4–10.2)
CHLORIDE SERPL-SCNC: 103 MMOL/L (ref 98–107)
CO2 SERPL-SCNC: 27 MMOL/L (ref 23–31)
CREAT SERPL-MCNC: 0.68 MG/DL (ref 0.55–1.02)
EOSINOPHIL # BLD AUTO: 0.09 X10(3)/MCL (ref 0–0.9)
EOSINOPHIL NFR BLD AUTO: 0.6 %
ERYTHROCYTE [DISTWIDTH] IN BLOOD BY AUTOMATED COUNT: 13 % (ref 11.5–17)
GFR SERPLBLD CREATININE-BSD FMLA CKD-EPI: >60 MLS/MIN/1.73/M2
GLOBULIN SER-MCNC: 3.3 GM/DL (ref 2.4–3.5)
GLUCOSE SERPL-MCNC: 196 MG/DL (ref 82–115)
HCT VFR BLD AUTO: 40.2 % (ref 37–47)
HGB BLD-MCNC: 13.2 GM/DL (ref 12–16)
IMM GRANULOCYTES # BLD AUTO: 0.16 X10(3)/MCL (ref 0–0.04)
IMM GRANULOCYTES NFR BLD AUTO: 1 %
INR BLD: 1.08 (ref 0–1.3)
LYMPHOCYTES # BLD AUTO: 1.27 X10(3)/MCL (ref 0.6–4.6)
LYMPHOCYTES NFR BLD AUTO: 7.9 %
MAGNESIUM SERPL-MCNC: 2.1 MG/DL (ref 1.6–2.6)
MCH RBC QN AUTO: 29.9 PG (ref 27–31)
MCHC RBC AUTO-ENTMCNC: 32.8 MG/DL (ref 33–36)
MCV RBC AUTO: 91 FL (ref 80–94)
MONOCYTES # BLD AUTO: 1.38 X10(3)/MCL (ref 0.1–1.3)
MONOCYTES NFR BLD AUTO: 8.6 %
NEUTROPHILS # BLD AUTO: 13.1 X10(3)/MCL (ref 2.1–9.2)
NEUTROPHILS NFR BLD AUTO: 81.6 %
NRBC BLD AUTO-RTO: 0 %
PLATELET # BLD AUTO: 115 X10(3)/MCL (ref 130–400)
PMV BLD AUTO: 13.4 FL (ref 7.4–10.4)
POCT GLUCOSE: 191 MG/DL (ref 70–110)
POCT GLUCOSE: 261 MG/DL (ref 70–110)
POTASSIUM SERPL-SCNC: 3.5 MMOL/L (ref 3.5–5.1)
PROT SERPL-MCNC: 6.2 GM/DL (ref 5.8–7.6)
PROTHROMBIN TIME: 13.9 SECONDS (ref 12.5–14.5)
RBC # BLD AUTO: 4.42 X10(6)/MCL (ref 4.2–5.4)
SODIUM SERPL-SCNC: 138 MMOL/L (ref 136–145)
WBC # SPEC AUTO: 16.1 X10(3)/MCL (ref 4.5–11.5)

## 2022-12-01 PROCEDURE — 63600175 PHARM REV CODE 636 W HCPCS: Performed by: NURSE PRACTITIONER

## 2022-12-01 PROCEDURE — 85610 PROTHROMBIN TIME: CPT | Performed by: NURSE PRACTITIONER

## 2022-12-01 PROCEDURE — 85025 COMPLETE CBC W/AUTO DIFF WBC: CPT | Performed by: NURSE PRACTITIONER

## 2022-12-01 PROCEDURE — 25000003 PHARM REV CODE 250: Performed by: NURSE PRACTITIONER

## 2022-12-01 PROCEDURE — 83735 ASSAY OF MAGNESIUM: CPT | Performed by: NURSE PRACTITIONER

## 2022-12-01 PROCEDURE — 25000003 PHARM REV CODE 250: Performed by: ANESTHESIOLOGY

## 2022-12-01 PROCEDURE — 36415 COLL VENOUS BLD VENIPUNCTURE: CPT | Performed by: NURSE PRACTITIONER

## 2022-12-01 PROCEDURE — 90648 HIB PRP-T VACCINE 4 DOSE IM: CPT | Performed by: NURSE PRACTITIONER

## 2022-12-01 PROCEDURE — 80053 COMPREHEN METABOLIC PANEL: CPT | Performed by: NURSE PRACTITIONER

## 2022-12-01 PROCEDURE — 85730 THROMBOPLASTIN TIME PARTIAL: CPT | Performed by: NURSE PRACTITIONER

## 2022-12-01 PROCEDURE — 90471 IMMUNIZATION ADMIN: CPT | Performed by: NURSE PRACTITIONER

## 2022-12-01 RX ORDER — ACETAMINOPHEN 10 MG/ML
1000 INJECTION, SOLUTION INTRAVENOUS EVERY 8 HOURS
Status: DISCONTINUED | OUTPATIENT
Start: 2022-12-01 | End: 2022-12-01 | Stop reason: HOSPADM

## 2022-12-01 RX ADMIN — SIMETHICONE 80 MG: 80 TABLET, CHEWABLE ORAL at 06:12

## 2022-12-01 RX ADMIN — SITAGLIPTIN 100 MG: 25 TABLET, FILM COATED ORAL at 10:12

## 2022-12-01 RX ADMIN — INSULIN ASPART 3 UNITS: 100 INJECTION, SOLUTION INTRAVENOUS; SUBCUTANEOUS at 10:12

## 2022-12-01 RX ADMIN — ACETAMINOPHEN 1000 MG: 10 INJECTION, SOLUTION INTRAVENOUS at 05:12

## 2022-12-01 RX ADMIN — ACETAMINOPHEN 1000 MG: 10 INJECTION, SOLUTION INTRAVENOUS at 02:12

## 2022-12-01 RX ADMIN — ALVIMOPAN 12 MG: 12 CAPSULE ORAL at 10:12

## 2022-12-01 RX ADMIN — FAMOTIDINE 20 MG: 10 INJECTION, SOLUTION INTRAVENOUS at 10:12

## 2022-12-01 RX ADMIN — Medication 0.5 ML: at 10:12

## 2022-12-01 RX ADMIN — GLIMEPIRIDE 4 MG: 4 TABLET ORAL at 06:12

## 2022-12-01 NOTE — PLAN OF CARE
Problem: Adult Inpatient Plan of Care  Goal: Plan of Care Review  12/1/2022 1551 by Evans Mackay RN  Outcome: Met  12/1/2022 1256 by Evans Mackay RN  Outcome: Ongoing, Progressing  12/1/2022 1256 by Evans Mackay RN  Outcome: Ongoing, Progressing  Goal: Patient-Specific Goal (Individualized)  12/1/2022 1551 by Evans Mackay RN  Outcome: Met  12/1/2022 1256 by Evans Mackay RN  Outcome: Ongoing, Progressing  12/1/2022 1256 by Evans Mackay RN  Outcome: Ongoing, Progressing  Goal: Absence of Hospital-Acquired Illness or Injury  12/1/2022 1551 by Evans Mackay RN  Outcome: Met  12/1/2022 1256 by Evans Mackay RN  Outcome: Ongoing, Progressing  12/1/2022 1256 by Evans Mackay RN  Outcome: Ongoing, Progressing  Goal: Optimal Comfort and Wellbeing  12/1/2022 1551 by Evans Mackay RN  Outcome: Met  12/1/2022 1256 by Evans Mackay RN  Outcome: Ongoing, Progressing  12/1/2022 1256 by Evans Mackay RN  Outcome: Ongoing, Progressing  Goal: Readiness for Transition of Care  12/1/2022 1551 by Evans Mackay RN  Outcome: Met  12/1/2022 1256 by Evans Mackay RN  Outcome: Ongoing, Progressing  12/1/2022 1256 by Evans Mackay RN  Outcome: Ongoing, Progressing     Problem: Infection  Goal: Absence of Infection Signs and Symptoms  12/1/2022 1551 by Evans Mackay RN  Outcome: Met  12/1/2022 1256 by Evans Mackay RN  Outcome: Ongoing, Progressing  12/1/2022 1256 by Evans Mackay RN  Outcome: Ongoing, Progressing     Problem: Skin Injury Risk Increased  Goal: Skin Health and Integrity  12/1/2022 1551 by Evans Mackay RN  Outcome: Met  12/1/2022 1256 by Evans Mackay RN  Outcome: Ongoing, Progressing  12/1/2022 1256 by Evans Mackay RN  Outcome: Ongoing, Progressing     Problem: Fall Injury Risk  Goal: Absence of Fall and Fall-Related Injury  12/1/2022 1551 by Evans Mackay RN  Outcome: Met  12/1/2022 1256 by Evans Mackay RN  Outcome: Ongoing, Progressing  12/1/2022 1256 by Evans Mackay,  RN  Outcome: Ongoing, Progressing

## 2022-12-01 NOTE — PLAN OF CARE
Problem: Adult Inpatient Plan of Care  Goal: Plan of Care Review  12/1/2022 1256 by Evans Mackay RN  Outcome: Ongoing, Progressing  12/1/2022 1256 by Evans Mackay RN  Outcome: Ongoing, Progressing  Goal: Patient-Specific Goal (Individualized)  12/1/2022 1256 by Evans Mackay RN  Outcome: Ongoing, Progressing  12/1/2022 1256 by Evans Mackay RN  Outcome: Ongoing, Progressing  Goal: Absence of Hospital-Acquired Illness or Injury  12/1/2022 1256 by Evans Mackay RN  Outcome: Ongoing, Progressing  12/1/2022 1256 by Evans Mackay RN  Outcome: Ongoing, Progressing  Goal: Optimal Comfort and Wellbeing  12/1/2022 1256 by Evans Mackay RN  Outcome: Ongoing, Progressing  12/1/2022 1256 by vEans Mackay RN  Outcome: Ongoing, Progressing  Goal: Readiness for Transition of Care  12/1/2022 1256 by Evans Mackay RN  Outcome: Ongoing, Progressing  12/1/2022 1256 by Evans Mackay RN  Outcome: Ongoing, Progressing     Problem: Infection  Goal: Absence of Infection Signs and Symptoms  12/1/2022 1256 by Evans Mackay RN  Outcome: Ongoing, Progressing  12/1/2022 1256 by Evans Mackay RN  Outcome: Ongoing, Progressing     Problem: Skin Injury Risk Increased  Goal: Skin Health and Integrity  12/1/2022 1256 by Evans Mackay RN  Outcome: Ongoing, Progressing  12/1/2022 1256 by Evans Mackay RN  Outcome: Ongoing, Progressing     Problem: Fall Injury Risk  Goal: Absence of Fall and Fall-Related Injury  12/1/2022 1256 by Evans Mackay RN  Outcome: Ongoing, Progressing  12/1/2022 1256 by Evans Mackay RN  Outcome: Ongoing, Progressing

## 2022-12-01 NOTE — PROGRESS NOTES
SURG ONC POD 3    PT DOING VERY WELL  NOW ON REG FOOD, TOLERATING WELL  PASSING GAS  PAIN CONTROLLED, REQUEST ONLY TYLENOL    ABD SOFT  INCISIONS HEALING WELL  OSTOMY PINK  DRAIN SEROSANG    VSS; NO FEVER  WBC 13510  HH 13/40  LYTES GOOD, BLOOD SUGAR IMPROVED    PLAN  DC HOME WITH DRAIN  FU OFFICE 2 WEEKS  NO RX PER PT REQUEST

## 2022-12-01 NOTE — PLAN OF CARE
Problem: Adult Inpatient Plan of Care  Goal: Plan of Care Review  Outcome: Ongoing, Progressing  Flowsheets (Taken 11/30/2022 2146)  Plan of Care Reviewed With:   patient   spouse  Goal: Patient-Specific Goal (Individualized)  Outcome: Ongoing, Progressing  Goal: Absence of Hospital-Acquired Illness or Injury  Outcome: Ongoing, Progressing  Intervention: Identify and Manage Fall Risk  Flowsheets (Taken 11/30/2022 2146)  Safety Promotion/Fall Prevention:   assistive device/personal item within reach   side rails raised x 2   lighting adjusted   medications reviewed   family to remain at bedside  Intervention: Prevent Skin Injury  Flowsheets (Taken 11/30/2022 2146)  Body Position: 30 degrees  Skin Protection: adhesive use limited  Intervention: Prevent and Manage VTE (Venous Thromboembolism) Risk  Flowsheets (Taken 11/30/2022 2146)  Activity Management:   Ambulated in room - L4   Ambulated to bathroom - L4  Range of Motion: active ROM (range of motion) encouraged  Intervention: Prevent Infection  Flowsheets (Taken 11/30/2022 2146)  Infection Prevention:   rest/sleep promoted   personal protective equipment utilized   single patient room provided   equipment surfaces disinfected   hand hygiene promoted  Goal: Optimal Comfort and Wellbeing  Outcome: Ongoing, Progressing  Intervention: Monitor Pain and Promote Comfort  Flowsheets (Taken 11/30/2022 2146)  Pain Management Interventions:   pain management plan reviewed with patient/caregiver   quiet environment facilitated   relaxation techniques promoted   position adjusted   pillow support provided   care clustered   around-the-clock dosing utilized  Intervention: Provide Person-Centered Care  Flowsheets (Taken 11/30/2022 2146)  Trust Relationship/Rapport:   questions encouraged   care explained   choices provided   reassurance provided   thoughts/feelings acknowledged   emotional support provided   empathic listening provided   questions answered  Goal: Readiness for  Transition of Care  Outcome: Ongoing, Progressing     Problem: Infection  Goal: Absence of Infection Signs and Symptoms  Outcome: Ongoing, Progressing  Intervention: Prevent or Manage Infection  Flowsheets (Taken 11/30/2022 2146)  Fever Reduction/Comfort Measures: lightweight bedding  Infection Management: aseptic technique maintained  Isolation Precautions: precautions maintained     Problem: Skin Injury Risk Increased  Goal: Skin Health and Integrity  Outcome: Ongoing, Progressing  Intervention: Optimize Skin Protection  Flowsheets (Taken 11/30/2022 2146)  Pressure Reduction Techniques: frequent weight shift encouraged  Skin Protection: adhesive use limited  Head of Bed (HOB) Positioning: HOB at 30 degrees  Intervention: Promote and Optimize Oral Intake  Flowsheets (Taken 11/30/2022 2146)  Oral Nutrition Promotion: physical activity promoted     Problem: Fall Injury Risk  Goal: Absence of Fall and Fall-Related Injury  Outcome: Ongoing, Progressing  Intervention: Identify and Manage Contributors  Flowsheets (Taken 11/30/2022 2146)  Self-Care Promotion:   independence encouraged   BADL personal objects within reach  Medication Review/Management: medications reviewed  Intervention: Promote Injury-Free Environment  Flowsheets (Taken 11/30/2022 2146)  Safety Promotion/Fall Prevention:   assistive device/personal item within reach   side rails raised x 2   lighting adjusted   medications reviewed   family to remain at bedside

## 2022-12-01 NOTE — DISCHARGE INSTRUCTIONS
Went over these instructions with the patient and her spouse. Sent him with gauze and specimen cup, as well as a sheet to keep track of outputs. Patient verbalized understanding

## 2022-12-02 LAB
POCT GLUCOSE: 193 MG/DL (ref 70–110)
POCT GLUCOSE: 332 MG/DL (ref 70–110)

## 2022-12-06 LAB
DHEA SERPL-MCNC: NORMAL
ESTROGEN SERPL-MCNC: NORMAL PG/ML
INSULIN SERPL-ACNC: NORMAL U[IU]/ML
LAB AP CLINICAL INFORMATION: NORMAL
LAB AP GROSS DESCRIPTION: NORMAL
LAB AP REPORT FOOTNOTES: NORMAL
T3RU NFR SERPL: NORMAL %

## 2022-12-08 ENCOUNTER — NURSE TRIAGE (OUTPATIENT)
Dept: ADMINISTRATIVE | Facility: CLINIC | Age: 77
End: 2022-12-08
Payer: MEDICARE

## 2022-12-08 NOTE — TELEPHONE ENCOUNTER
Reason for Disposition   [1] Blood glucose > 240 mg/dL (13.3 mmol/L) AND [2] rapid breathing    Additional Information   Negative: Unconscious or difficult to awaken   Negative: Acting confused (e.g., disoriented, slurred speech)   Negative: Very weak (e.g., can't stand)   Negative: Sounds like a life-threatening emergency to the triager   Negative: [1] Vomiting AND [2] signs of dehydration (e.g., very dry mouth, lightheaded, dark urine)    Protocols used: Diabetes - High Blood Sugar-A-    Pt states she had a tumor removed from her pancreas on 11/28/22.    States her blood sugar has been over 300 daily and she is not on insulin. Pt stated her blood sugar is 488 now. Reports rapid breathing and heart rate. Pt advised to have someone bring to the ED now for evaluation. Pt verbalized understanding.

## 2022-12-18 PROBLEM — C7A.8 NEUROENDOCRINE CARCINOMA OF PANCREAS: Status: ACTIVE | Noted: 2022-12-18

## 2022-12-18 PROBLEM — C7A.8 NEUROENDOCRINE CARCINOMA OF PANCREAS: Status: RESOLVED | Noted: 2022-12-18 | Resolved: 2022-12-18

## 2022-12-18 PROBLEM — K86.89 PANCREATIC MASS: Status: RESOLVED | Noted: 2022-12-01 | Resolved: 2022-12-18

## 2022-12-18 NOTE — HOSPITAL COURSE
The patient was admitted for pancreatic neuroendocrine tumor diagnosed by preoperative EUS/FNA, underwent laparoscopic/robotic distal pancreatectomy and splenectomy.  their hospital course was unremarkable, see progress notes for full details.  When they were afebrile, tolerating a diet and having bowel function they were ready for discharge.  Detailed instructions were given.

## 2022-12-18 NOTE — OP NOTE
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  Complete intraoperative diagnostic ultrasound the pancreas revealed a hypoechoic region in the tail of the pancreas concordant with preoperative imaging and EUS, grossly there was also a firm mass in this region.  Using ultrasound guidance distal pancreatectomy was performed with splenectomy and EN bloc lymphadenectomy    Procedure in detail:  After informed consent was obtained the patient was brought to the operating room placed supine position.  General endotracheal anesthesia was administered without difficulty.  The patient was placed in split leg position with pressure points carefully padded.  The abdomen was prepped and draped in sterile fashion.  Left upper quadrant incision was made an optical trocar was used into the peritoneal cavity under direct vision, pneumoperitoneum was achieved without difficulty.  Additional ports were placed under direct vision.  The abdomen was explored laparoscopically there was no evidence of metastatic disease.  The patient was placed in reverse Trendelenburg and tilted towards the right.  The robot was docked.  The gastrocolic ligament was incised and the lesser sac was opened widely exposing the anterior surface of the pancreas.  In the tail of the pancreas was firm whitish appearing mass.  Using the drop in BK probe, I performed  complete intraoperative diagnostic ultrasound of the pancreas visualizing the entire gland.  In the tail there was a hypoechoic masslike area concordant with preoperative imaging and EUS findings.  There were no other abnormalities.  Using ultrasound guidance I planned transection of the pancreas with adequate margin away from the mass just to the left of the neck/body junction.  Dissection was carried out along the inferior border of the pancreas entering the avascular plane posterior to the splenic vein.  The splenic vein and artery were then dissected circumferentially and divided using vascular load staplers.  The pancreas was then divided using a black load stapler and there was no evidence of significant fracturing of the gland, the staple line appeared to be intact.  Dissection along the superior border was then carried out including the peripancreatic and perisplenic lymph nodes EN bloc with the specimen.  The spleen was then detached from its peritoneal attachments and splenic flexure divided using the vessel sealer.  The spleen was then detached from the specimen along the edge of the capsule at the hilum using the vessel sealer.  The spleen and pancreas were then placed in separate Endo-Catch bags and extracted through the stapler port site.  The fascia was closed using 0 Vicryl suture.  Pneumoperitoneum was reestablished and the left upper quadrant was inspected for hemostasis which appeared to be excellent.  The omentum was mobilized off the transverse colon in the avascular plane, the left-sided omental vessels were taken using the vessel sealer and the right-sided omental vessels were preserved off of the gastroepiploic arcade preserving flow to the omental pedicle flap that was created.  Fibrin glue was then placed over the pancreatic stump and in the left upper quadrant.  The omental flap was then advanced to fill the void in the left upper quadrant retroperitoneum.  A 19 [place] Antony drain was left  near the pancreatic transection margin.  The abdomen was again inspected carefully for hemostasis which appeared to be excellent.  Ports removed pneumoperitoneum was relieved, port sites were closed absorbable suture sterile dressings were applied.  The patient tolerated the procedure well.    Significant surgical tasks conducted by the assistant:  The skilled assistance of the nurse practitioner JUANCHO Barrios was necessary for the successful completion of this case.  She was essential for the proper positioning of the patient, manipulation of instruments, proper exposure, manipulation of tissue, and wound closure        Ernesto Stearns MD  Surgical Oncology  Complex General, Gastrointestinal and Hepatobiliary Surgery

## 2022-12-18 NOTE — DISCHARGE SUMMARY
[facility] - Oncology Acute  General Surgery  Discharge Summary      Patient Name: Sierra Alva  MRN: [id number]  Admission Date: 11/28/2022  Hospital Length of Stay: 2 days  Discharge Date and Time: 12/1/2022  4:16 PM  Attending Physician: No att. providers found   Discharging Provider: Ernesto Stearns MD  Primary Care Provider: Drew Esquivel MD    HPI:   No notes on file    Procedure(s) (LRB):  ROBOTIC PANCREATECTOMY (N/A)      Indwelling Lines/Drains at time of discharge:   Lines/Drains/Airways     Drain  Duration                Colostomy LLQ -- days         Closed/Suction Drain 11/28/22 1458 Right;Anterior Abdomen Bulb 19 Fr. 19 days              Hospital Course: The patient was admitted for pancreatic neuroendocrine tumor diagnosed by preoperative EUS/FNA, underwent laparoscopic/robotic distal pancreatectomy and splenectomy.  their hospital course was unremarkable, see progress notes for full details.  When they were afebrile, tolerating a diet and having bowel function they were ready for discharge.  Detailed instructions were given.        Goals of Care Treatment Preferences:         Consults:     Significant Diagnostic Studies:   Specimen (24h ago, onward)    None          Pending Diagnostic Studies:     None        Final Active Diagnoses:      Problems Resolved During this Admission:    Diagnosis Date Noted Date Resolved POA    PRINCIPAL PROBLEM:  Neuroendocrine carcinoma of pancreas [C7A.8] 12/18/2022 12/18/2022 Unknown    Pancreatic mass [K86.89] 12/01/2022 12/18/2022 Yes      Discharged Condition: good    Disposition: Home or Self Care    Follow Up:   Follow-up Information     Ernesto Stearns MD. Go on 12/21/2022.    Specialty: Surgical Oncology  Why: 10:30 in the morning.  Contact information:  [address] [address]  [phone]             JUANCHO Vieyra Follow up in 2 week(s).    Specialty: Nurse Practitioner  Contact information:  [address]  [address]  [facility] [address]  [phone]                       Patient Instructions:   No discharge procedures on file.  Medications:  Reconciled Home Medications:      Medication List      CONTINUE taking these medications    ascorbic acid (vitamin C) 500 MG tablet  Commonly known as: VITAMIN C  Take 500 mg by mouth once daily.     b complex vitamins tablet  Take 1 tablet by mouth once daily.     BIOTIN ORAL  Take 1 tablet by mouth Daily.     gabapentin 300 MG capsule  Commonly known as: NEURONTIN  Take 300 mg by mouth 2 (two) times daily.     * glimepiride 4 MG tablet  Commonly known as: AMARYL  Take 4 mg by mouth every morning.     * glimepiride 4 MG tablet  Commonly known as: AMARYL  Take 4 mg by mouth before breakfast.     GLUCOSAMINE-CHONDROITIN ORAL  Take 1 tablet by mouth Daily.     GRAPE SEED ORAL  Take 1 capsule by mouth Daily.     * hydroCHLOROthiazide 25 MG tablet  Commonly known as: HYDRODIURIL  Take 25 mg by mouth once daily.     * hydroCHLOROthiazide 25 MG tablet  Commonly known as: HYDRODIURIL  Take 25 mg by mouth once daily.     * JANUVIA 100 MG Tab  Generic drug: SITagliptin phosphate  Take 100 mg by mouth once daily.     * SITagliptin phosphate 100 MG Tab  Commonly known as: JANUVIA  Take 100 mg by mouth once daily.     * losartan 25 MG tablet  Commonly known as: COZAAR  Take 25 mg by mouth once daily.     * losartan 25 MG tablet  Commonly known as: COZAAR  Take 25 mg by mouth once daily.     MAGNESIUM ORAL  Take 1 tablet by mouth Daily.     * montelukast 10 mg tablet  Commonly known as: SINGULAIR  Take 10 mg by mouth once daily.     * montelukast 10 mg tablet  Commonly known as: SINGULAIR  Take 10 mg by mouth every evening.     NON FORMULARY MEDICATION  Take 1 tablet by mouth once daily. [facility]     NON FORMULARY MEDICATION  Take 1 tablet by mouth every other day. Liver Detox     * omeprazole 20 MG capsule  Commonly known as: PRILOSEC  Take 20 mg by mouth once daily.     * omeprazole  20 MG capsule  Commonly known as: PRILOSEC  Take 20 mg by mouth once daily.     POTASSIUM ORAL  Take 1 tablet by mouth Daily.     * sertraline 50 MG tablet  Commonly known as: ZOLOFT  Take 50 mg by mouth once daily.     * sertraline 50 MG tablet  Commonly known as: ZOLOFT  Take 50 mg by mouth once daily.         * This list has 14 medication(s) that are the same as other medications prescribed for you. Read the directions carefully, and ask your doctor or other care provider to review them with you.              Time spent on the discharge of patient:    minutes    Ernesto Stearns MD  General Surgery  [facility] - Oncology Acute

## 2022-12-21 ENCOUNTER — OFFICE VISIT (OUTPATIENT)
Dept: SURGICAL ONCOLOGY | Facility: CLINIC | Age: 77
End: 2022-12-21
Payer: MEDICARE

## 2022-12-21 DIAGNOSIS — C7A.8 NEUROENDOCRINE CARCINOMA OF PANCREAS: Primary | ICD-10-CM

## 2022-12-21 DIAGNOSIS — C25.9 MALIGNANT NEOPLASM OF PANCREAS, UNSPECIFIED LOCATION OF MALIGNANCY: Primary | ICD-10-CM

## 2022-12-21 PROCEDURE — 99024 POSTOP FOLLOW-UP VISIT: CPT | Mod: POP,,, | Performed by: NURSE PRACTITIONER

## 2022-12-21 PROCEDURE — 99024 PR POST-OP FOLLOW-UP VISIT: ICD-10-PCS | Mod: POP,,, | Performed by: NURSE PRACTITIONER

## 2022-12-21 NOTE — PROGRESS NOTES
POST OP LAP/KAYLA DISTAL PANCREATECTOMY/SPLEENECTOMY    PT APPEARS TO BE DOING WELL  COMPLAINS OF PAIN AT DRAIN SITE  REPORTS WENT TO HOSPITAL IN [place] DUE TO ELEVATED BLOOD SUGARS  HER PRIMARY MD IS DR CASTILLO THERE, SHE MISSED ONE APPT AND TELLS ME HAS BEEN TRYING TO GET IN SINCE THEN  SHE IS EATING, HAVING BMS  NO FEVER OR CHILLS  DRAIN STILL IN PLACE, REPORTS LOW OUTPUT    ABD SOFT  INCISIONS HEALING WELL  NO SIGNS OF INFECTION  DRAIN SEROUS APPEARING    WILL SEND DRAIN FLUID FOR DRAIN AMYLASE    CALLED AND SPOKE WITH BETTYE AT DR CASTILLO OFFICE  REQUESTED APPT ASAP AND STRESSED TO HER THE IMPORTANCE OF MD SEEING HER SOON DUE TO UNCONTROLLED SUGARS; SHE STATES SHE UNDERSTANDS THE IMPORTANCE AND WILL CALL HER WITH APPT    PT IS AWARE TO GO TO ER IF NEEDED    PLAN   FOLLOW DRAIN AMYLASE  RTC ONE WEEK

## 2023-01-06 ENCOUNTER — DOCUMENTATION ONLY (OUTPATIENT)
Dept: SURGICAL ONCOLOGY | Facility: CLINIC | Age: 78
End: 2023-01-06
Payer: MEDICARE

## 2023-01-06 NOTE — PROGRESS NOTES
Patient calls to inform that she cannot make it to [place] for her appt with Dr. Stearns this morning.  She is having diarrhea and increased abdominal pain where her drain is, same pain she was having prior to the drain being placed.  I advised her that she should be seen in the ER for re-evaluation for the increase in pain.  She tells me she does not want to go back to the ER, she will wait it out and if she is still hurting and not feeling well she will go there later this afternoon.  I asked she please keep our office updated on her status so we can eventually get her in to see Dr. Stearns for management of the drain.  Appt cancelled for today per her request.

## 2023-01-11 ENCOUNTER — DOCUMENTATION ONLY (OUTPATIENT)
Dept: SURGICAL ONCOLOGY | Facility: CLINIC | Age: 78
End: 2023-01-11
Payer: MEDICARE

## 2023-01-11 NOTE — PROGRESS NOTES
BACK DATE: 1-10-23  Spoke with patient per phone, she was unable to come to her scheduled appt on this past Friday due to feeling ill.  Today she tells me that she is having some abdominal pain and the drain that was placed during surgery with Dr. Stearns has a very foul odor (the drainage) and redness around the insertion site.  I spoke with Dr. Stearns in regards to this; patient also sees Dr. Lambert in [place] was saw her in the hospital for recent admit and drain placement to abscess.  Dr. Stearns is currently out of town, he asks me to call Dr. Lambert's office to see if she can see the patient or possibly get a CT scan done and follow it.  I spoke with Magui, Dr. Lambert's nurse, who then discussed with Dr. Lambert; patient had called their office the day before with these complaints but also shortness of breath.  Magui tells me that Dr. Lambert suggests the patient to be seen in the ER for all above complaints vs ordering a CT scan.  Magui spoke to the patient to advise ER visit however the patient refused.    1-11-23  I spoke with patient per phone who tells me she saw her PCP Dr. Esquivel today and he ordered a CT scan that she had done today.  She is awaiting a phone call from him for results.  She also tells me that she woke up this morning and the drain placed during Dr. Stearns's surgery had fallen out, the site is draining a small amount of old blood.  She tells me she is feeling better today.  She is scheduled to see Dr. Stearns this coming Tuesday in clinic.  I will get a copy of CT scan done today and patient will be bringing a copy of the scan on disc.

## 2023-01-17 ENCOUNTER — OFFICE VISIT (OUTPATIENT)
Dept: SURGICAL ONCOLOGY | Facility: CLINIC | Age: 78
End: 2023-01-17
Payer: MEDICARE

## 2023-01-17 DIAGNOSIS — K86.2 PANCREATIC CYST: Primary | ICD-10-CM

## 2023-01-17 PROCEDURE — 99024 PR POST-OP FOLLOW-UP VISIT: ICD-10-PCS | Mod: POP,,, | Performed by: SURGERY

## 2023-01-17 PROCEDURE — 99024 POSTOP FOLLOW-UP VISIT: CPT | Mod: POP,,, | Performed by: SURGERY

## 2023-01-25 ENCOUNTER — DOCUMENTATION ONLY (OUTPATIENT)
Dept: SURGICAL ONCOLOGY | Facility: CLINIC | Age: 78
End: 2023-01-25
Payer: MEDICARE

## 2023-01-25 NOTE — PROGRESS NOTES
Patient reports her drain is putting out approximately 5cc per day.  Dr. Stearns spoke with Dr. Garcia in [place] who agrees to pull her drain out.  I spoke with him office, they will call the patient to come in for drain removal.  I notified Ms. Esquivel that their office would be calling her to come in.

## (undated) DEVICE — OBTURATOR BLADELESS 8MM XI CLR

## (undated) DEVICE — ELECTRODE REM PLYHSV RETURN 9

## (undated) DEVICE — SYR ONLY LUER LOCK 20CC

## (undated) DEVICE — KIT GEN LAPAROSCOPY LAFAYETTE

## (undated) DEVICE — COVER PROBE US 5.5X58L NON LTX

## (undated) DEVICE — BANDAGE CURITY SHEER ADH 1X3IN

## (undated) DEVICE — NDL BIOPSY SHARKCORE 22G

## (undated) DEVICE — SET TRI-LUMEN FILTERED TUBE

## (undated) DEVICE — BLLN ULTRASONIC LINEAR FLEX

## (undated) DEVICE — KIT SURGICAL COLON .25 1.1OZ

## (undated) DEVICE — STAPLER SUREFORM SGL USE 45

## (undated) DEVICE — RELOAD SUREFORM 45 4.3 GRN 6R

## (undated) DEVICE — STAPLER ENDOWRIST 30 WHITE

## (undated) DEVICE — TROCAR KII FIOS ZTHREAD 11X100

## (undated) DEVICE — CONTAINER SPECIMEN SCREW 4OZ

## (undated) DEVICE — SHEATH ENDOWRIST 45MM

## (undated) DEVICE — COLLECTION SPECIMEN NEPTUNE

## (undated) DEVICE — BAG INZII TISS RETRV 12/15MM

## (undated) DEVICE — ADHESIVE DERMABOND MINI HV

## (undated) DEVICE — IRRIGATOR SUCTION W/TIP

## (undated) DEVICE — TIP SUCTION YANKAUER

## (undated) DEVICE — SOL IRRI STRL WATER 1000ML

## (undated) DEVICE — TUBING O2 FEMALE CONN 13FT

## (undated) DEVICE — DRAIN JACKSON PRATT TRCR 19FR

## (undated) DEVICE — GLOVE PROTEXIS HYDROGEL SZ7

## (undated) DEVICE — DRAPE COLUMN DAVINCI XI

## (undated) DEVICE — PAD PINK TRENDELENBURG POS XL

## (undated) DEVICE — RELOAD SUREFORM 45 4.6 BLK 6R

## (undated) DEVICE — DRAPE ARM DAVINCI XI

## (undated) DEVICE — SEALER VESSEL EXTEND

## (undated) DEVICE — SYR 20ML BLUE LUER LOCK

## (undated) DEVICE — RESERVOIR JACKSON-PRATT 100CC

## (undated) DEVICE — GLOVE PROTEXIS HYDROGEL SZ6.5

## (undated) DEVICE — HEMOSTAT SURGICEL 2X14IN

## (undated) DEVICE — GOWN POLY REINF BRTH SLV XL

## (undated) DEVICE — ADHESIVE DERMABOND ADVANCED

## (undated) DEVICE — SUT 2/0 30IN SILK BLK BRAI

## (undated) DEVICE — GLOVE PROTEXIS BLUE LATEX 7

## (undated) DEVICE — KIT SURGICAL TURNOVER

## (undated) DEVICE — SOL NACL IRR 1000ML BTL

## (undated) DEVICE — UNDERPAD DISPOSABLE 30X30IN

## (undated) DEVICE — SUT MCRYL PLUS 4-0 PS2 27IN

## (undated) DEVICE — SEALANT VISTASEAL FIBRIN 10ML

## (undated) DEVICE — PORT ACCESS 8MM W/120MM LOW

## (undated) DEVICE — LEGGING SURG CONV 43X28IN

## (undated) DEVICE — SEAL UNIVERSAL 5MM-8MM XI

## (undated) DEVICE — APPLICATOR VISTASEAL RIG 35CM

## (undated) DEVICE — NDL 20GX1-1/2IN IB

## (undated) DEVICE — KIT CANIST SUCTION 1200CC

## (undated) DEVICE — SUT SILK 2.0 BLK 18

## (undated) DEVICE — CANNULA SEAL 12MM

## (undated) DEVICE — COVER MAYO STAND REINFRCD 30

## (undated) DEVICE — HOLDER STRIP-T SELF ADH 2X10IN

## (undated) DEVICE — TROCAR ENDO Z THREAD KII 5X100

## (undated) DEVICE — CARTRIDGE BABCOCK GRASPER 5X45

## (undated) DEVICE — TRAY CATH FOL SIL URIMTR 16FR

## (undated) DEVICE — CHLORAPREP W TINT 26ML APPL

## (undated) DEVICE — COVER TIP CURVED SCISSORS XI

## (undated) DEVICE — SOL CLEARIFY VISUALIZATION LAP

## (undated) DEVICE — SUT VICRYL+ 27 UR-6 VIOL

## (undated) DEVICE — CATH CUFF BLLN US RADIAL FLEX